# Patient Record
Sex: FEMALE | Race: WHITE | NOT HISPANIC OR LATINO | Employment: OTHER | URBAN - NONMETROPOLITAN AREA
[De-identification: names, ages, dates, MRNs, and addresses within clinical notes are randomized per-mention and may not be internally consistent; named-entity substitution may affect disease eponyms.]

---

## 2017-02-09 ENCOUNTER — OFFICE VISIT (OUTPATIENT)
Dept: FAMILY MEDICINE CLINIC | Facility: CLINIC | Age: 57
End: 2017-02-09

## 2017-02-09 VITALS
OXYGEN SATURATION: 98 % | SYSTOLIC BLOOD PRESSURE: 120 MMHG | WEIGHT: 211 LBS | TEMPERATURE: 99.1 F | DIASTOLIC BLOOD PRESSURE: 70 MMHG | HEIGHT: 66 IN | RESPIRATION RATE: 12 BRPM | BODY MASS INDEX: 33.91 KG/M2 | HEART RATE: 95 BPM

## 2017-02-09 DIAGNOSIS — G36.0 NEUROMYELITIS OPTICA (HCC): ICD-10-CM

## 2017-02-09 DIAGNOSIS — N31.9 NEUROGENIC DYSFUNCTION OF THE URINARY BLADDER: ICD-10-CM

## 2017-02-09 DIAGNOSIS — E55.9 VITAMIN D DEFICIENCY: ICD-10-CM

## 2017-02-09 DIAGNOSIS — Z00.00 HEALTHCARE MAINTENANCE: ICD-10-CM

## 2017-02-09 DIAGNOSIS — Z85.41 HISTORY OF CERVICAL CANCER: ICD-10-CM

## 2017-02-09 DIAGNOSIS — E78.2 MIXED HYPERLIPIDEMIA: Primary | ICD-10-CM

## 2017-02-09 DIAGNOSIS — J44.9 CHRONIC OBSTRUCTIVE PULMONARY DISEASE, UNSPECIFIED COPD TYPE (HCC): ICD-10-CM

## 2017-02-09 DIAGNOSIS — M79.2 NEUROPATHIC PAIN: ICD-10-CM

## 2017-02-09 DIAGNOSIS — F17.200 CURRENT SMOKER: ICD-10-CM

## 2017-02-09 PROCEDURE — 99214 OFFICE O/P EST MOD 30 MIN: CPT | Performed by: GENERAL PRACTICE

## 2017-02-09 NOTE — PROGRESS NOTES
Subjective   Mary Alvares is a 56 y.o. female.     History of Present Illness     Neuromyelitis Optica  She complains of a persistent visual impairment associated with weakness of her gait and allodynia from the waist down.  She has a history of bladder dysfunction with recurrent urinary tract infections.  She continues to be followed by neurology and while records are unavailable apparently underwent a recent reassessment.  No changes were made in her management and she will return again in July    COPD  The patient reports that her cough has been unchanged. She states that the symptoms occur at any time during the day or night. She reports that her symptoms become worse with activity and exposure to cold air. Her symptoms are reduced with rest. The patient states she also has no other symptoms. She is following the treatment plan, including medications as directed. She currently smokes approximately 1 packs per day.    Dyslipidemia  Compliance with treatment has been fair. The patient exercises occasionally. She is currently being prescribed the following medication for her dyslipidemia - lifestyle modifcation.  Patient has declined lipid-lowering therapy. Most recent lipids include  Lab Results   Component Value Date    TRIG 252 (H) 08/09/2016    TRIG 421 (H) 08/03/2015    TRIG 266 (H) 03/27/2014    HDL 42 (L) 08/09/2016    HDL 45 (L) 08/03/2015    HDL 48 (L) 03/27/2014    LDLCALC 241 (H) 08/09/2016    LDL No Calculation 08/03/2015     (H) 03/27/2014     The following portions of the patient's history were reviewed and updated as appropriate: allergies, current medications, past medical history, past social history and problem list.    Review of Systems   Constitutional: Positive for fatigue. Negative for appetite change, chills, fever and unexpected weight change.   HENT: Negative for congestion, ear pain, rhinorrhea, sneezing, sore throat and voice change.    Eyes: Positive for visual disturbance.    Respiratory: Positive for cough. Negative for shortness of breath and wheezing.    Cardiovascular: Negative for chest pain, palpitations and leg swelling.   Gastrointestinal: Negative for abdominal pain, blood in stool, constipation, diarrhea, nausea and vomiting.   Endocrine: Negative for polydipsia.   Genitourinary: Negative for difficulty urinating, dysuria, frequency, hematuria, menstrual problem, pelvic pain, urgency, vaginal bleeding and vaginal discharge.   Musculoskeletal: Negative for arthralgias, back pain, joint swelling, myalgias and neck pain.   Skin: Negative for color change.   Neurological: Positive for weakness (mild generalized with gait instability) and numbness. Negative for tremors and headaches.   Psychiatric/Behavioral: Negative for dysphoric mood, sleep disturbance and suicidal ideas. The patient is not nervous/anxious.      Objective   Physical Exam   Constitutional: She is oriented to person, place, and time. No distress.   Gait slow and cautious.  No apparent distress.  No pallor, cyanosis, diaphoresis, or jaundice.   HENT:   Head: Atraumatic.   Right Ear: Tympanic membrane, external ear and ear canal normal.   Left Ear: Tympanic membrane, external ear and ear canal normal.   Nose: Nose normal.   Mouth/Throat: Oropharynx is clear and moist. Mucous membranes are not pale and not cyanotic.   Eyes: EOM are normal. Pupils are equal, round, and reactive to light. No scleral icterus.   Neck: No JVD present. Carotid bruit is not present. No tracheal deviation present. No thyromegaly present.   Cardiovascular: Normal rate, regular rhythm, S1 normal, S2 normal and intact distal pulses.  Exam reveals no gallop, no S3 and no S4.    No murmur heard.  Pulmonary/Chest: No accessory muscle usage or stridor. No respiratory distress. She has decreased breath sounds in the right lower field and the left lower field. She has wheezes (mild diffuse).   Abdominal: Soft. Normal aorta and bowel sounds are  normal. She exhibits no distension, no abdominal bruit and no mass. There is no hepatosplenomegaly. There is no tenderness. No hernia.   Musculoskeletal: She exhibits no tenderness or deformity.       Vascular Status -  Her exam exhibits no right foot edema. Her exam exhibits left foot vasculature abnormal and no left foot edema.  Lymphadenopathy:        Head (right side): No submandibular adenopathy present.        Head (left side): No submandibular adenopathy present.     She has no cervical adenopathy.   Neurological: She is alert and oriented to person, place, and time. She displays normal reflexes. No cranial nerve deficit. She exhibits abnormal muscle tone (mild right-sided spasticity). Gait abnormal. Coordination normal.   Reflex Scores:       Tricep reflexes are 2+ on the right side and 2+ on the left side.       Bicep reflexes are 2+ on the right side and 2+ on the left side.       Brachioradialis reflexes are 2+ on the right side and 2+ on the left side.       Patellar reflexes are 3+ on the right side and 3+ on the left side.       Achilles reflexes are 3+ on the right side and 3+ on the left side.  Skin: Skin is warm and dry. No rash noted. She is not diaphoretic. No cyanosis. No pallor. Nails show no clubbing.   Psychiatric: She has a normal mood and affect.     Assessment/Plan   Problems Addressed this Visit        Cardiovascular and Mediastinum    Mixed hyperlipidemia - Primary  Encouraged to continue to work on her diet and what exercise she can tolerate        Respiratory    COPD (chronic obstructive pulmonary disease)  COPD is stable.  Reminded of the importance of smoking cessation  Encouraged to remain as active as symptoms allow for       Digestive    Vitamin D deficiency       Nervous and Auditory    Neuropathic pain    Neuromyelitis optica  Supportive therapy.    Reminded to follow-up with neurology        Genitourinary    Neurogenic dysfunction of the urinary bladder       Other    Current  smoker  Reminded of the options with respect to cessation     History of cervical cancer  Patient remains uninterested in a Pap smear     Healthcare maintenance  Patient states she underwent a DEXA scan and mammogram since last year.  We'll try to obtain records.  Reminded of the potential benefits of colon and lung cancer screening.  She remains uninterested at present

## 2017-05-30 DIAGNOSIS — G36.0 NEUROMYELITIS OPTICA (HCC): ICD-10-CM

## 2017-05-30 DIAGNOSIS — M79.2 NEUROPATHIC PAIN: ICD-10-CM

## 2017-05-30 RX ORDER — TRAMADOL HYDROCHLORIDE 50 MG/1
50 TABLET ORAL 4 TIMES DAILY
Qty: 120 TABLET | Refills: 2 | Status: SHIPPED | OUTPATIENT
Start: 2017-05-30 | End: 2018-01-16 | Stop reason: SDUPTHER

## 2017-05-30 RX ORDER — DIAZEPAM 5 MG/1
TABLET ORAL
Qty: 60 TABLET | Refills: 2 | Status: SHIPPED | OUTPATIENT
Start: 2017-05-30 | End: 2017-10-16 | Stop reason: SDUPTHER

## 2017-05-30 RX ORDER — BACLOFEN 20 MG/1
20 TABLET ORAL 3 TIMES DAILY
Qty: 90 TABLET | Refills: 3 | Status: SHIPPED | OUTPATIENT
Start: 2017-05-30 | End: 2017-10-16 | Stop reason: SDUPTHER

## 2017-05-31 RX ORDER — BACLOFEN 20 MG/1
TABLET ORAL
Qty: 270 TABLET | Refills: 3 | OUTPATIENT
Start: 2017-05-31

## 2017-07-05 RX ORDER — CIPROFLOXACIN 500 MG/1
500 TABLET, FILM COATED ORAL 2 TIMES DAILY
Qty: 20 TABLET | Refills: 0 | Status: SHIPPED | OUTPATIENT
Start: 2017-07-05 | End: 2017-07-15

## 2017-08-18 ENCOUNTER — OFFICE VISIT (OUTPATIENT)
Dept: FAMILY MEDICINE CLINIC | Facility: CLINIC | Age: 57
End: 2017-08-18

## 2017-08-18 VITALS
WEIGHT: 203 LBS | HEART RATE: 120 BPM | HEIGHT: 66 IN | TEMPERATURE: 98.6 F | BODY MASS INDEX: 32.62 KG/M2 | DIASTOLIC BLOOD PRESSURE: 68 MMHG | OXYGEN SATURATION: 91 % | SYSTOLIC BLOOD PRESSURE: 108 MMHG

## 2017-08-18 DIAGNOSIS — J01.01 ACUTE RECURRENT MAXILLARY SINUSITIS: Primary | ICD-10-CM

## 2017-08-18 PROCEDURE — 99213 OFFICE O/P EST LOW 20 MIN: CPT | Performed by: PHYSICIAN ASSISTANT

## 2017-08-18 RX ORDER — ASPIRIN 81 MG/1
81 TABLET, CHEWABLE ORAL DAILY
COMMUNITY
End: 2020-12-04

## 2017-08-18 RX ORDER — AZITHROMYCIN 250 MG/1
TABLET, FILM COATED ORAL
Qty: 6 TABLET | Refills: 0 | Status: SHIPPED | OUTPATIENT
Start: 2017-08-18 | End: 2017-09-08

## 2017-08-23 NOTE — PROGRESS NOTES
Subjective   Mary Alvares is a 57 y.o. female.     History of Present Illness     Acute illness-  She complains of runny nose, facial pressure, teeth hurt, and sore throat x 1 week.  She is on imuran and has immunocompetancy issue.      The following portions of the patient's history were reviewed and updated as appropriate: allergies, current medications, past family history, past medical history, past social history, past surgical history and problem list.    Review of Systems   Constitutional: Negative for activity change, appetite change and fever.   HENT: Negative for ear pain, sinus pressure and sore throat.    Eyes: Negative for pain and visual disturbance.   Respiratory: Negative for cough and chest tightness.    Cardiovascular: Negative for chest pain and palpitations.   Gastrointestinal: Negative for abdominal pain, constipation, diarrhea, nausea and vomiting.   Endocrine: Negative for polydipsia and polyuria.   Genitourinary: Negative for dysuria and frequency.   Musculoskeletal: Negative for back pain and myalgias.   Skin: Negative for color change and rash.   Allergic/Immunologic: Negative for food allergies and immunocompromised state.   Neurological: Negative for dizziness, syncope and headaches.   Hematological: Negative for adenopathy. Does not bruise/bleed easily.   Psychiatric/Behavioral: Negative for hallucinations and suicidal ideas. The patient is not nervous/anxious.        Objective   Physical Exam   Constitutional: She is oriented to person, place, and time. She appears well-developed and well-nourished. No distress.   HENT:   Head: Normocephalic and atraumatic.   Right Ear: Tympanic membrane, external ear and ear canal normal.   Left Ear: Tympanic membrane, external ear and ear canal normal.   Max sinus tenderness   Neck: Normal range of motion. Neck supple. No thyromegaly present.   Cardiovascular: Normal rate and regular rhythm.    No murmur heard.  Pulmonary/Chest: Effort normal and breath  sounds normal. She has no wheezes. She has no rales.   Neurological: She is alert and oriented to person, place, and time.   Skin: Skin is warm and dry.   Psychiatric: She has a normal mood and affect. Her behavior is normal.              Assessment/Plan   Diagnoses and all orders for this visit:    Acute recurrent maxillary sinusitis  -     azithromycin (ZITHROMAX) 250 MG tablet; Take 2 tablets the first day, then 1 tablet daily for 4 days.

## 2017-09-08 ENCOUNTER — OFFICE VISIT (OUTPATIENT)
Dept: FAMILY MEDICINE CLINIC | Facility: CLINIC | Age: 57
End: 2017-09-08

## 2017-09-08 DIAGNOSIS — M79.2 NEUROPATHIC PAIN: ICD-10-CM

## 2017-09-08 DIAGNOSIS — E78.2 MIXED HYPERLIPIDEMIA: Primary | ICD-10-CM

## 2017-09-08 DIAGNOSIS — G36.0 NEUROMYELITIS OPTICA (HCC): ICD-10-CM

## 2017-09-08 DIAGNOSIS — J44.9 CHRONIC OBSTRUCTIVE PULMONARY DISEASE, UNSPECIFIED COPD TYPE (HCC): ICD-10-CM

## 2017-09-08 DIAGNOSIS — E55.9 VITAMIN D DEFICIENCY: ICD-10-CM

## 2017-09-08 DIAGNOSIS — Z85.41 HISTORY OF CERVICAL CANCER: ICD-10-CM

## 2017-09-08 DIAGNOSIS — F17.200 CURRENT SMOKER: ICD-10-CM

## 2017-09-08 DIAGNOSIS — Z87.440 H/O RECURRENT URINARY TRACT INFECTION: ICD-10-CM

## 2017-09-08 DIAGNOSIS — N31.9 NEUROGENIC DYSFUNCTION OF THE URINARY BLADDER: ICD-10-CM

## 2017-09-08 DIAGNOSIS — Z00.00 HEALTHCARE MAINTENANCE: ICD-10-CM

## 2017-09-08 PROCEDURE — 99214 OFFICE O/P EST MOD 30 MIN: CPT | Performed by: GENERAL PRACTICE

## 2017-09-08 NOTE — PROGRESS NOTES
Subjective   Mary Alvares is a 57 y.o. female.     History of Present Illness     Neuromyelitis Optica  She complains of a persistent visual impairment associated with weakness of her gait and allodynia from the waist down.  She has a history of bladder dysfunction with recurrent urinary tract infections.  She continues to be followed by neurology and underwent a recent reassessment with no changes made in her management. She was advised to undergo an opthalmology assessment and was scheduled back in 6 months    COPD  The patient reports that her cough has been unchanged. She states that this symptom occurs at any time during the day or night. She reports that her symptom becomes worse with activity and exposure to cold air. Her symptom is reduced with rest. The patient states she has no other symptoms. She is following the treatment plan, including medications as directed. She currently smokes approximately 1 pack per day.    Dyslipidemia  Compliance with treatment has been fair. The patient exercises occasionally. She is currently being prescribed the following medication for her dyslipidemia - lifestyle modifcation.  Patient has declined lipid-lowering therapy. She has had no recent labs    The following portions of the patient's history were reviewed and updated as appropriate: allergies, current medications, past medical history, past social history and problem list.    Review of Systems   Constitutional: Positive for fatigue. Negative for appetite change, chills, fever and unexpected weight change.   HENT: Negative for congestion, ear pain, rhinorrhea, sneezing, sore throat and voice change.    Eyes: Positive for visual disturbance.   Respiratory: Positive for cough. Negative for shortness of breath and wheezing.    Cardiovascular: Negative for chest pain, palpitations and leg swelling.   Gastrointestinal: Negative for abdominal pain, blood in stool, constipation, diarrhea, nausea and vomiting.   Endocrine:  Negative for polydipsia.   Genitourinary: Negative for difficulty urinating, dysuria, frequency, hematuria, menstrual problem, pelvic pain, urgency, vaginal bleeding and vaginal discharge.   Musculoskeletal: Negative for arthralgias, back pain, joint swelling, myalgias and neck pain.   Skin: Negative for color change.   Neurological: Positive for weakness (mild generalized with gait instability) and numbness. Negative for tremors and headaches.   Psychiatric/Behavioral: Negative for dysphoric mood, sleep disturbance and suicidal ideas. The patient is not nervous/anxious.      Objective   Physical Exam   Constitutional: She is oriented to person, place, and time. No distress.   Gait slow and cautious.  No apparent distress.  No pallor, cyanosis, diaphoresis, or jaundice.   HENT:   Head: Atraumatic.   Right Ear: Tympanic membrane, external ear and ear canal normal.   Left Ear: Tympanic membrane, external ear and ear canal normal.   Nose: Nose normal.   Mouth/Throat: Oropharynx is clear and moist. Mucous membranes are not pale and not cyanotic.   Eyes: EOM are normal. Pupils are equal, round, and reactive to light. No scleral icterus.   Neck: No JVD present. Carotid bruit is not present. No tracheal deviation present. No thyromegaly present.   Cardiovascular: Normal rate, regular rhythm, S1 normal, S2 normal and intact distal pulses.  Exam reveals no gallop, no S3 and no S4.    No murmur heard.  Pulmonary/Chest: No accessory muscle usage or stridor. No respiratory distress. She has decreased breath sounds in the right lower field and the left lower field. She has wheezes (mild diffuse).   Abdominal: Soft. Normal aorta and bowel sounds are normal. She exhibits no distension, no abdominal bruit and no mass. There is no hepatosplenomegaly. There is no tenderness. No hernia.   Musculoskeletal: She exhibits no tenderness or deformity.       Vascular Status -  Her exam exhibits no right foot edema. Her exam exhibits left  foot vasculature abnormal and no left foot edema.  Lymphadenopathy:        Head (right side): No submandibular adenopathy present.        Head (left side): No submandibular adenopathy present.     She has no cervical adenopathy.   Neurological: She is alert and oriented to person, place, and time. She displays normal reflexes. No cranial nerve deficit. She exhibits abnormal muscle tone (mild right-sided spasticity). Gait abnormal. Coordination normal.   Reflex Scores:       Tricep reflexes are 2+ on the right side and 2+ on the left side.       Bicep reflexes are 2+ on the right side and 2+ on the left side.       Brachioradialis reflexes are 2+ on the right side and 2+ on the left side.       Patellar reflexes are 3+ on the right side and 3+ on the left side.       Achilles reflexes are 3+ on the right side and 3+ on the left side.  Skin: Skin is warm and dry. No rash noted. She is not diaphoretic. No cyanosis. No pallor. Nails show no clubbing.   Psychiatric: She has a normal mood and affect.     Assessment/Plan   Problems Addressed this Visit        Cardiovascular and Mediastinum    Mixed hyperlipidemia  Encouraged to continue to work on her diet and exercise plan.  Fasting labs scheduled    Relevant Orders    Comprehensive Metabolic Panel    Lipid Panel    TSH       Respiratory    COPD (chronic obstructive pulmonary disease)  COPD is stable.  Reminded of the importance of smoking cessation  Encouraged to remain as active as symptoms allow for    Relevant Orders    CBC & Differential       Digestive    Vitamin D deficiency  Will continue to monitor    Relevant Orders    Vitamin D 25 Hydroxy       Nervous and Auditory    Neuropathic pain    Neuromyelitis optica  Will arrange an opthalmology assessment  Follow up with neurology    Relevant Orders    Vitamin B12    Ambulatory Referral to Ophthalmology       Genitourinary    Neurogenic dysfunction of the urinary bladder       Other    Current smoker    History of  cervical cancer  Patient remains uninterested in a pap smear    H/O recurrent urinary tract infection    Healthcare maintenance  Recommended an updated mammogram  - patient will let us know when she wants this scheduled. She remains uninterested in colon or lung cancer screening or in any immunizations

## 2017-09-09 VITALS
TEMPERATURE: 98 F | BODY MASS INDEX: 32.62 KG/M2 | DIASTOLIC BLOOD PRESSURE: 70 MMHG | RESPIRATION RATE: 12 BRPM | WEIGHT: 203 LBS | OXYGEN SATURATION: 95 % | HEIGHT: 66 IN | HEART RATE: 96 BPM | SYSTOLIC BLOOD PRESSURE: 125 MMHG

## 2017-10-16 DIAGNOSIS — G36.0 NEUROMYELITIS OPTICA (HCC): ICD-10-CM

## 2017-10-16 DIAGNOSIS — M79.2 NEUROPATHIC PAIN: ICD-10-CM

## 2017-10-16 RX ORDER — DIAZEPAM 5 MG/1
TABLET ORAL
Qty: 60 TABLET | Refills: 0 | Status: SHIPPED | OUTPATIENT
Start: 2017-10-16 | End: 2018-01-16 | Stop reason: SDUPTHER

## 2017-10-16 RX ORDER — BACLOFEN 20 MG/1
TABLET ORAL
Qty: 120 TABLET | Refills: 5 | Status: SHIPPED | OUTPATIENT
Start: 2017-10-16 | End: 2018-11-14 | Stop reason: SDUPTHER

## 2018-01-16 ENCOUNTER — OFFICE VISIT (OUTPATIENT)
Dept: FAMILY MEDICINE CLINIC | Facility: CLINIC | Age: 58
End: 2018-01-16

## 2018-01-16 DIAGNOSIS — M79.2 NEUROPATHIC PAIN: ICD-10-CM

## 2018-01-16 DIAGNOSIS — E78.2 MIXED HYPERLIPIDEMIA: Primary | ICD-10-CM

## 2018-01-16 DIAGNOSIS — E55.9 VITAMIN D DEFICIENCY: ICD-10-CM

## 2018-01-16 DIAGNOSIS — J44.9 CHRONIC OBSTRUCTIVE PULMONARY DISEASE, UNSPECIFIED COPD TYPE (HCC): ICD-10-CM

## 2018-01-16 DIAGNOSIS — N31.9 NEUROGENIC DYSFUNCTION OF THE URINARY BLADDER: ICD-10-CM

## 2018-01-16 DIAGNOSIS — F17.200 CURRENT SMOKER: ICD-10-CM

## 2018-01-16 DIAGNOSIS — Z87.440 H/O RECURRENT URINARY TRACT INFECTION: ICD-10-CM

## 2018-01-16 DIAGNOSIS — Z00.00 HEALTHCARE MAINTENANCE: ICD-10-CM

## 2018-01-16 DIAGNOSIS — G36.0 NEUROMYELITIS OPTICA (HCC): ICD-10-CM

## 2018-01-16 PROCEDURE — 99214 OFFICE O/P EST MOD 30 MIN: CPT | Performed by: GENERAL PRACTICE

## 2018-01-16 RX ORDER — DIAZEPAM 5 MG/1
TABLET ORAL
Qty: 60 TABLET | Refills: 2 | Status: SHIPPED | OUTPATIENT
Start: 2018-01-16 | End: 2018-05-17 | Stop reason: SDUPTHER

## 2018-01-16 RX ORDER — TRAMADOL HYDROCHLORIDE 50 MG/1
50 TABLET ORAL 4 TIMES DAILY
Qty: 120 TABLET | Refills: 2 | Status: SHIPPED | OUTPATIENT
Start: 2018-01-16 | End: 2018-05-07 | Stop reason: SDUPTHER

## 2018-01-16 RX ORDER — GABAPENTIN 600 MG/1
600 TABLET ORAL 3 TIMES DAILY
Qty: 90 TABLET | Refills: 2 | Status: SHIPPED | OUTPATIENT
Start: 2018-01-16 | End: 2018-05-17 | Stop reason: SDUPTHER

## 2018-01-17 VITALS
OXYGEN SATURATION: 96 % | DIASTOLIC BLOOD PRESSURE: 70 MMHG | RESPIRATION RATE: 12 BRPM | WEIGHT: 211 LBS | TEMPERATURE: 98.2 F | SYSTOLIC BLOOD PRESSURE: 115 MMHG | HEIGHT: 66 IN | BODY MASS INDEX: 33.91 KG/M2 | HEART RATE: 100 BPM

## 2018-01-17 NOTE — PROGRESS NOTES
Subjective   Mary Alvares is a 57 y.o. female.     History of Present Illness     Neuromyelitis Optica  She complains of a persistent visual impairment associated with weakness of her gait and allodynia from the waist down.  She has a history of bladder dysfunction with recurrent urinary tract infections.  She continues to be followed by neurology and underwent an assessment since last here with no apparent changes made in her management. She also underwent an opthalmology assessment since last here. She will continue to be followed by each every 6 months    COPD  The patient reports that her cough has been unchanged. She states that this symptom occurs at any time during the day or night. She reports that her symptom becomes worse with activity and exposure to cold air. Her symptom is reduced with rest. The patient states she has no other symptoms. She is following the treatment plan, including medications as directed. She continues to smoke approximately 1 pack per day.    Dyslipidemia  Compliance with treatment has been fair. The patient exercises occasionally. She is currently being prescribed the following medication for her dyslipidemia - lifestyle modifcation.  Patient has declined lipid-lowering therapy. She has had no recent labs    The following portions of the patient's history were reviewed and updated as appropriate: allergies, current medications, past medical history, past social history and problem list.    Review of Systems   Constitutional: Positive for fatigue. Negative for appetite change, chills, fever and unexpected weight change.   HENT: Negative for congestion, ear pain, rhinorrhea, sneezing, sore throat and voice change.    Eyes: Positive for visual disturbance.   Respiratory: Positive for cough. Negative for shortness of breath and wheezing.    Cardiovascular: Negative for chest pain, palpitations and leg swelling.   Gastrointestinal: Negative for abdominal pain, blood in stool, constipation,  diarrhea, nausea and vomiting.   Endocrine: Negative for polydipsia.   Genitourinary: Negative for difficulty urinating, dysuria, frequency, hematuria, menstrual problem, pelvic pain, urgency, vaginal bleeding and vaginal discharge.   Musculoskeletal: Negative for arthralgias, back pain, joint swelling, myalgias and neck pain.   Skin: Negative for color change.   Neurological: Positive for weakness (mild generalized with gait instability) and numbness. Negative for tremors and headaches.   Psychiatric/Behavioral: Negative for dysphoric mood, sleep disturbance and suicidal ideas. The patient is not nervous/anxious.      Objective   Physical Exam   Constitutional: She is oriented to person, place, and time. No distress.   Gait slow and cautious.  No apparent distress.  No pallor, cyanosis, diaphoresis, or jaundice.   HENT:   Head: Atraumatic.   Right Ear: Tympanic membrane, external ear and ear canal normal.   Left Ear: Tympanic membrane, external ear and ear canal normal.   Nose: Nose normal.   Mouth/Throat: Oropharynx is clear and moist. Mucous membranes are not pale and not cyanotic.   Eyes: EOM are normal. Pupils are equal, round, and reactive to light. No scleral icterus.   Neck: No JVD present. Carotid bruit is not present. No tracheal deviation present. No thyromegaly present.   Cardiovascular: Normal rate, regular rhythm, S1 normal, S2 normal and intact distal pulses.  Exam reveals no gallop, no S3 and no S4.    No murmur heard.  Pulmonary/Chest: No accessory muscle usage or stridor. No respiratory distress. She has decreased breath sounds in the right lower field and the left lower field. She has wheezes (mild diffuse).   Abdominal: Soft. Normal aorta and bowel sounds are normal. She exhibits no distension, no abdominal bruit and no mass. There is no hepatosplenomegaly. There is no tenderness. No hernia.   Musculoskeletal: She exhibits no tenderness or deformity.       Vascular Status -  Her exam exhibits no  right foot edema. Her exam exhibits left foot vasculature abnormal and no left foot edema.  Lymphadenopathy:        Head (right side): No submandibular adenopathy present.        Head (left side): No submandibular adenopathy present.     She has no cervical adenopathy.   Neurological: She is alert and oriented to person, place, and time. She displays normal reflexes. No cranial nerve deficit. She exhibits abnormal muscle tone (mild right-sided spasticity). Gait abnormal. Coordination normal.   Reflex Scores:       Tricep reflexes are 2+ on the right side and 2+ on the left side.       Bicep reflexes are 2+ on the right side and 2+ on the left side.       Brachioradialis reflexes are 2+ on the right side and 2+ on the left side.       Patellar reflexes are 3+ on the right side and 3+ on the left side.       Achilles reflexes are 3+ on the right side and 3+ on the left side.  Skin: Skin is warm and dry. No rash noted. She is not diaphoretic. No cyanosis. No pallor. Nails show no clubbing.   Psychiatric: She has a normal mood and affect.     Assessment/Plan   Problems Addressed this Visit        Cardiovascular and Mediastinum    Mixed hyperlipidemia   Encouraged to continue to work on her diet and exercise plan.  Updated labs arranged    Relevant Orders    Lipid Panel    TSH       Respiratory    COPD (chronic obstructive pulmonary disease)  COPD is stable.  Reminded of the importance of smoking cessation  Encouraged to remain as active as symptoms allow for       Digestive    Vitamin D deficiency  Continue maintenance supplementation with monitoring.    Relevant Orders    Vitamin D 25 Hydroxy       Nervous and Auditory    Neuropathic pain  Reminded regarding symptomatic treatment.   Continue current medication    Relevant Medications    traMADol (ULTRAM) 50 MG tablet    diazePAM (VALIUM) 5 MG tablet    gabapentin (NEURONTIN) 600 MG tablet    Neuromyelitis optica  Follow up with neurology    Relevant Medications     traMADol (ULTRAM) 50 MG tablet    diazePAM (VALIUM) 5 MG tablet    gabapentin (NEURONTIN) 600 MG tablet    Other Relevant Orders    CBC & Differential    Comprehensive Metabolic Panel       Genitourinary    Neurogenic dysfunction of the urinary bladder       Other    Current smoker    H/O recurrent urinary tract infection    Healthcare maintenance  Hepatitis C screen will be performed with her next labs  Patient remains uninterested in any immunizations nor in breast, cervical, colon, or lung cancer screening    Relevant Orders    Hepatitis C Antibody

## 2018-01-29 RX ORDER — AZATHIOPRINE 50 MG/1
50 TABLET ORAL DAILY
Qty: 30 TABLET | Refills: 2 | Status: SHIPPED | OUTPATIENT
Start: 2018-01-29 | End: 2020-03-06 | Stop reason: SDUPTHER

## 2018-04-26 ENCOUNTER — TELEPHONE (OUTPATIENT)
Dept: FAMILY MEDICINE CLINIC | Facility: CLINIC | Age: 58
End: 2018-04-26

## 2018-04-26 RX ORDER — CIPROFLOXACIN 500 MG/1
500 TABLET, FILM COATED ORAL 2 TIMES DAILY
Qty: 20 TABLET | Refills: 0 | Status: SHIPPED | OUTPATIENT
Start: 2018-04-26 | End: 2018-05-06

## 2018-04-26 NOTE — TELEPHONE ENCOUNTER
Pt is aware.   ----- Message from Jacky Bob MD sent at 4/26/2018  3:07 PM EDT -----  Ida sent a script but ideally she should drop off a urine sample for C+S as it may be a bacteria resistant to ciprofloxacin and once she starts on it any further cultures will be useless    ----- Message -----  From: Luli Domínguez MA  Sent: 4/26/2018   1:08 PM  To: Jacky Bob MD    Patient wants to know if you will send her in some Cipro 500 for a uti infection.

## 2018-05-07 DIAGNOSIS — G36.0 NEUROMYELITIS OPTICA (HCC): ICD-10-CM

## 2018-05-07 DIAGNOSIS — M79.2 NEUROPATHIC PAIN: ICD-10-CM

## 2018-05-07 RX ORDER — TRAMADOL HYDROCHLORIDE 50 MG/1
TABLET ORAL
Qty: 120 TABLET | Refills: 0 | Status: SHIPPED | OUTPATIENT
Start: 2018-05-07 | End: 2018-05-17 | Stop reason: SDUPTHER

## 2018-05-17 ENCOUNTER — OFFICE VISIT (OUTPATIENT)
Dept: FAMILY MEDICINE CLINIC | Facility: CLINIC | Age: 58
End: 2018-05-17

## 2018-05-17 VITALS
OXYGEN SATURATION: 95 % | SYSTOLIC BLOOD PRESSURE: 120 MMHG | DIASTOLIC BLOOD PRESSURE: 70 MMHG | BODY MASS INDEX: 33.91 KG/M2 | WEIGHT: 211 LBS | HEIGHT: 66 IN | RESPIRATION RATE: 12 BRPM | TEMPERATURE: 98.2 F | HEART RATE: 95 BPM

## 2018-05-17 DIAGNOSIS — J44.9 COPD MIXED TYPE (HCC): ICD-10-CM

## 2018-05-17 DIAGNOSIS — J30.2 CHRONIC SEASONAL ALLERGIC RHINITIS, UNSPECIFIED TRIGGER: ICD-10-CM

## 2018-05-17 DIAGNOSIS — M79.2 NEUROPATHIC PAIN: ICD-10-CM

## 2018-05-17 DIAGNOSIS — Z87.440 H/O RECURRENT URINARY TRACT INFECTION: ICD-10-CM

## 2018-05-17 DIAGNOSIS — G36.0 NEUROMYELITIS OPTICA (HCC): ICD-10-CM

## 2018-05-17 DIAGNOSIS — E55.9 VITAMIN D DEFICIENCY: ICD-10-CM

## 2018-05-17 DIAGNOSIS — E78.2 MIXED HYPERLIPIDEMIA: Primary | ICD-10-CM

## 2018-05-17 DIAGNOSIS — N31.9 NEUROGENIC DYSFUNCTION OF THE URINARY BLADDER: ICD-10-CM

## 2018-05-17 DIAGNOSIS — Z00.00 HEALTHCARE MAINTENANCE: ICD-10-CM

## 2018-05-17 DIAGNOSIS — F17.200 CURRENT SMOKER: ICD-10-CM

## 2018-05-17 LAB
25(OH)D3 SERPL-MCNC: 23 NG/ML
ALBUMIN SERPL-MCNC: 4.5 G/DL (ref 3.5–5)
ALBUMIN/GLOB SERPL: 1.4 G/DL (ref 1.5–2.5)
ALP SERPL-CCNC: 89 U/L (ref 35–104)
ALT SERPL W P-5'-P-CCNC: 24 U/L (ref 10–36)
ANION GAP SERPL CALCULATED.3IONS-SCNC: 4.3 MMOL/L (ref 3.6–11.2)
AST SERPL-CCNC: 31 U/L (ref 10–30)
BASOPHILS # BLD AUTO: 0.03 10*3/MM3 (ref 0–0.3)
BASOPHILS NFR BLD AUTO: 0.4 % (ref 0–2)
BILIRUB SERPL-MCNC: 0.3 MG/DL (ref 0.2–1.8)
BUN BLD-MCNC: 11 MG/DL (ref 7–21)
BUN/CREAT SERPL: 16.2 (ref 7–25)
CALCIUM SPEC-SCNC: 9.6 MG/DL (ref 7.7–10)
CHLORIDE SERPL-SCNC: 105 MMOL/L (ref 99–112)
CHOLEST SERPL-MCNC: 299 MG/DL (ref 0–200)
CO2 SERPL-SCNC: 26.7 MMOL/L (ref 24.3–31.9)
CREAT BLD-MCNC: 0.68 MG/DL (ref 0.43–1.29)
DEPRECATED RDW RBC AUTO: 49.4 FL (ref 37–54)
EOSINOPHIL # BLD AUTO: 0.16 10*3/MM3 (ref 0–0.7)
EOSINOPHIL NFR BLD AUTO: 2.3 % (ref 0–5)
ERYTHROCYTE [DISTWIDTH] IN BLOOD BY AUTOMATED COUNT: 15.1 % (ref 11.5–14.5)
GFR SERPL CREATININE-BSD FRML MDRD: 89 ML/MIN/1.73
GLOBULIN UR ELPH-MCNC: 3.2 GM/DL
GLUCOSE BLD-MCNC: 84 MG/DL (ref 70–110)
HCT VFR BLD AUTO: 47.9 % (ref 37–47)
HCV AB SER DONR QL: NORMAL
HDLC SERPL-MCNC: 40 MG/DL (ref 60–100)
HGB BLD-MCNC: 15.3 G/DL (ref 12–16)
IMM GRANULOCYTES # BLD: 0.02 10*3/MM3 (ref 0–0.03)
IMM GRANULOCYTES NFR BLD: 0.3 % (ref 0–0.5)
LDLC SERPL CALC-MCNC: 211 MG/DL (ref 0–100)
LDLC/HDLC SERPL: 5.28 {RATIO}
LYMPHOCYTES # BLD AUTO: 2.86 10*3/MM3 (ref 1–3)
LYMPHOCYTES NFR BLD AUTO: 41.8 % (ref 21–51)
MCH RBC QN AUTO: 29.1 PG (ref 27–33)
MCHC RBC AUTO-ENTMCNC: 31.9 G/DL (ref 33–37)
MCV RBC AUTO: 91.2 FL (ref 80–94)
MONOCYTES # BLD AUTO: 0.68 10*3/MM3 (ref 0.1–0.9)
MONOCYTES NFR BLD AUTO: 9.9 % (ref 0–10)
NEUTROPHILS # BLD AUTO: 3.09 10*3/MM3 (ref 1.4–6.5)
NEUTROPHILS NFR BLD AUTO: 45.3 % (ref 30–70)
OSMOLALITY SERPL CALC.SUM OF ELEC: 270.6 MOSM/KG (ref 273–305)
PLATELET # BLD AUTO: 249 10*3/MM3 (ref 130–400)
PMV BLD AUTO: 12.1 FL (ref 6–10)
POTASSIUM BLD-SCNC: 4.7 MMOL/L (ref 3.5–5.3)
PROT SERPL-MCNC: 7.7 G/DL (ref 6–8)
RBC # BLD AUTO: 5.25 10*6/MM3 (ref 4.2–5.4)
SODIUM BLD-SCNC: 136 MMOL/L (ref 135–153)
TRIGL SERPL-MCNC: 239 MG/DL (ref 0–150)
TSH SERPL DL<=0.05 MIU/L-ACNC: 1 MIU/ML (ref 0.55–4.78)
VLDLC SERPL-MCNC: 47.8 MG/DL
WBC NRBC COR # BLD: 6.84 10*3/MM3 (ref 4.5–12.5)

## 2018-05-17 PROCEDURE — 82306 VITAMIN D 25 HYDROXY: CPT | Performed by: GENERAL PRACTICE

## 2018-05-17 PROCEDURE — 99214 OFFICE O/P EST MOD 30 MIN: CPT | Performed by: GENERAL PRACTICE

## 2018-05-17 PROCEDURE — 84443 ASSAY THYROID STIM HORMONE: CPT | Performed by: GENERAL PRACTICE

## 2018-05-17 PROCEDURE — 36415 COLL VENOUS BLD VENIPUNCTURE: CPT | Performed by: GENERAL PRACTICE

## 2018-05-17 PROCEDURE — 85025 COMPLETE CBC W/AUTO DIFF WBC: CPT | Performed by: GENERAL PRACTICE

## 2018-05-17 PROCEDURE — 80053 COMPREHEN METABOLIC PANEL: CPT | Performed by: GENERAL PRACTICE

## 2018-05-17 PROCEDURE — 80061 LIPID PANEL: CPT | Performed by: GENERAL PRACTICE

## 2018-05-17 PROCEDURE — 86803 HEPATITIS C AB TEST: CPT | Performed by: GENERAL PRACTICE

## 2018-05-17 RX ORDER — DIAZEPAM 5 MG/1
TABLET ORAL
Qty: 60 TABLET | Refills: 2 | Status: SHIPPED | OUTPATIENT
Start: 2018-05-17 | End: 2019-05-24 | Stop reason: SDUPTHER

## 2018-05-17 RX ORDER — GABAPENTIN 600 MG/1
1200 TABLET ORAL 3 TIMES DAILY
Qty: 180 TABLET | Refills: 2 | Status: SHIPPED | OUTPATIENT
Start: 2018-05-17 | End: 2019-05-24 | Stop reason: SDUPTHER

## 2018-05-17 RX ORDER — TRAMADOL HYDROCHLORIDE 50 MG/1
50 TABLET ORAL 4 TIMES DAILY
Qty: 120 TABLET | Refills: 2 | Status: SHIPPED | OUTPATIENT
Start: 2018-05-17 | End: 2018-09-13 | Stop reason: SDUPTHER

## 2018-05-17 RX ORDER — LORATADINE 10 MG/1
10 TABLET ORAL DAILY PRN
Qty: 30 TABLET | Refills: 5 | Status: SHIPPED | OUTPATIENT
Start: 2018-05-17 | End: 2020-09-16

## 2018-05-17 NOTE — PROGRESS NOTES
Subjective   Mary Alvares is a 58 y.o. female.     History of Present Illness     Neuromyelitis Optica  She complains of a persistent visual impairment associated with weakness of her gait and allodynia from the waist down.  She has a history of bladder dysfunction with recurrent urinary tract infections.  She denies any change in her symptoms.  She continues to be followed by neurology and underwent an assessment since last here with no apparent changes made in her management.  She will be undergoing an updated MRI in 6 months with follow-up again afterward    Allergic Rhinitis  Patient has a history of allergic rhinitis. Patient's symptoms include sneezing, clear rhinorrhea, nasal congestion and postnasal drip. These symptoms are perennial with seasonal exacerbation. The patient has been suffering from these symptoms for a number of years . The patient has tried loratadine with good relief of symptoms.      COPD  The patient reports that her cough has been unchanged. She states that this symptom occurs at any time during the day or night. She reports that her symptom becomes worse with activity and exposure to cold air. Her symptom is reduced with rest. The patient denies any chest pain, shortness of breath, hemoptysis, night sweats, or weight loss. She is following the treatment plan, including medications as directed. She continues to smoke approximately 1 pack per day.    Dyslipidemia  Compliance with treatment has been fair. The patient exercises occasionally. She is currently being prescribed the following medication for her dyslipidemia - lifestyle modifcation.  Patient has declined lipid-lowering therapy.  Fasting labs were drawn this morning    The following portions of the patient's history were reviewed and updated as appropriate: allergies, current medications, past medical history, past social history and problem list.    Review of Systems   Constitutional: Positive for fatigue. Negative for appetite  change, chills, fever and unexpected weight change.   HENT: Positive for congestion, postnasal drip, rhinorrhea and sneezing. Negative for ear pain, sore throat and voice change.    Eyes: Positive for visual disturbance.   Respiratory: Positive for cough. Negative for shortness of breath and wheezing.    Cardiovascular: Negative for chest pain, palpitations and leg swelling.   Gastrointestinal: Negative for abdominal pain, blood in stool, constipation, diarrhea, nausea and vomiting.   Endocrine: Negative for polydipsia.   Genitourinary: Negative for difficulty urinating, dysuria, frequency, hematuria, menstrual problem, pelvic pain, urgency, vaginal bleeding and vaginal discharge.   Musculoskeletal: Negative for arthralgias, back pain, joint swelling, myalgias and neck pain.   Skin: Negative for color change.   Neurological: Positive for weakness (mild generalized with gait instability) and numbness. Negative for tremors and headaches.   Psychiatric/Behavioral: Negative for dysphoric mood, sleep disturbance and suicidal ideas. The patient is not nervous/anxious.      Objective   Physical Exam   Constitutional: She is oriented to person, place, and time. No distress.   Gait slow and cautious.  No apparent distress.  No pallor, cyanosis, diaphoresis, or jaundice.   HENT:   Head: Atraumatic.   Right Ear: Tympanic membrane, external ear and ear canal normal.   Left Ear: Tympanic membrane, external ear and ear canal normal.   Nose: Nose normal.   Mouth/Throat: Oropharynx is clear and moist. Mucous membranes are not pale and not cyanotic.   Eyes: EOM are normal. Pupils are equal, round, and reactive to light. No scleral icterus.   Neck: No JVD present. Carotid bruit is not present. No tracheal deviation present. No thyromegaly present.   Cardiovascular: Normal rate, regular rhythm, S1 normal, S2 normal and intact distal pulses.  Exam reveals no gallop, no S3 and no S4.    No murmur heard.  Pulmonary/Chest: No accessory  muscle usage or stridor. No respiratory distress. She has decreased breath sounds in the right lower field and the left lower field. She has wheezes (mild diffuse).   Abdominal: Soft. Normal aorta and bowel sounds are normal. She exhibits no distension, no abdominal bruit and no mass. There is no hepatosplenomegaly. There is no tenderness. No hernia.   Musculoskeletal: She exhibits no tenderness or deformity.     Vascular Status -  Her right foot exhibits no edema. Her left foot exhibits normal foot vasculature  and no edema.  Lymphadenopathy:        Head (right side): No submandibular adenopathy present.        Head (left side): No submandibular adenopathy present.     She has no cervical adenopathy.   Neurological: She is alert and oriented to person, place, and time. She displays normal reflexes. No cranial nerve deficit. She exhibits abnormal muscle tone (mild right-sided spasticity). Gait abnormal. Coordination normal.   Reflex Scores:       Tricep reflexes are 2+ on the right side and 2+ on the left side.       Bicep reflexes are 2+ on the right side and 2+ on the left side.       Brachioradialis reflexes are 2+ on the right side and 2+ on the left side.       Patellar reflexes are 3+ on the right side and 3+ on the left side.       Achilles reflexes are 3+ on the right side and 3+ on the left side.  Skin: Skin is warm and dry. No rash noted. She is not diaphoretic. No cyanosis. No pallor. Nails show no clubbing.   Psychiatric: She has a normal mood and affect.     Assessment/Plan   Problems Addressed this Visit        Cardiovascular and Mediastinum    Mixed hyperlipidemia   Encouraged to continue to work on her diet and exercise plan.       Respiratory    COPD mixed type    COPD is stable.  Reminded of the importance of smoking cessation  Encouraged to remain as active as symptoms allow for        Chronic seasonal allergic rhinitis  Reminded regarding allergen avoidance.  We will continue loratadine as needed      Relevant Medications    loratadine (CLARITIN) 10 MG tablet       Digestive    Vitamin D deficiency  Continue supplementation with monitoring.       Nervous and Auditory    Neuropathic pain  Reminded regarding symptomatic treatment.   Continue current medication    Relevant Medications    diazePAM (VALIUM) 5 MG tablet    gabapentin (NEURONTIN) 600 MG tablet    traMADol (ULTRAM) 50 MG tablet    Neuromyelitis optica  Supportive therapy  Follow up with neurology     Relevant Medications    diazePAM (VALIUM) 5 MG tablet    gabapentin (NEURONTIN) 600 MG tablet    traMADol (ULTRAM) 50 MG tablet       Genitourinary    Neurogenic dysfunction of the urinary bladder       Other    Current smoker    H/O recurrent urinary tract infection    Healthcare maintenance  Patient remains uninterested in any immunizations or any cancer screening modalities

## 2018-08-31 ENCOUNTER — OFFICE VISIT (OUTPATIENT)
Dept: FAMILY MEDICINE CLINIC | Facility: CLINIC | Age: 58
End: 2018-08-31

## 2018-08-31 VITALS
BODY MASS INDEX: 33.59 KG/M2 | DIASTOLIC BLOOD PRESSURE: 68 MMHG | OXYGEN SATURATION: 98 % | TEMPERATURE: 98 F | HEIGHT: 66 IN | WEIGHT: 209 LBS | HEART RATE: 98 BPM | SYSTOLIC BLOOD PRESSURE: 114 MMHG

## 2018-08-31 DIAGNOSIS — R30.0 DYSURIA: ICD-10-CM

## 2018-08-31 DIAGNOSIS — N31.9 NEUROGENIC DYSFUNCTION OF THE URINARY BLADDER: ICD-10-CM

## 2018-08-31 DIAGNOSIS — N30.90 CYSTITIS: Primary | ICD-10-CM

## 2018-08-31 DIAGNOSIS — E78.2 MIXED HYPERLIPIDEMIA: ICD-10-CM

## 2018-08-31 DIAGNOSIS — J44.9 COPD MIXED TYPE (HCC): ICD-10-CM

## 2018-08-31 PROCEDURE — 99214 OFFICE O/P EST MOD 30 MIN: CPT | Performed by: PHYSICIAN ASSISTANT

## 2018-08-31 PROCEDURE — 81003 URINALYSIS AUTO W/O SCOPE: CPT | Performed by: PHYSICIAN ASSISTANT

## 2018-08-31 RX ORDER — CIPROFLOXACIN 500 MG/1
500 TABLET, FILM COATED ORAL 2 TIMES DAILY
Qty: 20 TABLET | Refills: 0 | Status: SHIPPED | OUTPATIENT
Start: 2018-08-31 | End: 2018-09-10

## 2018-09-13 DIAGNOSIS — G36.0 NEUROMYELITIS OPTICA (HCC): ICD-10-CM

## 2018-09-13 DIAGNOSIS — M79.2 NEUROPATHIC PAIN: ICD-10-CM

## 2018-09-13 RX ORDER — TRAMADOL HYDROCHLORIDE 50 MG/1
TABLET ORAL
Qty: 120 TABLET | Refills: 0 | Status: SHIPPED | OUTPATIENT
Start: 2018-09-13 | End: 2019-01-23 | Stop reason: SDUPTHER

## 2018-11-14 RX ORDER — BACLOFEN 20 MG/1
TABLET ORAL
Qty: 120 TABLET | Refills: 5 | Status: SHIPPED | OUTPATIENT
Start: 2018-11-14 | End: 2021-01-01 | Stop reason: SDUPTHER

## 2018-11-27 ENCOUNTER — OFFICE VISIT (OUTPATIENT)
Dept: FAMILY MEDICINE CLINIC | Facility: CLINIC | Age: 58
End: 2018-11-27

## 2018-11-27 DIAGNOSIS — N31.9 NEUROGENIC DYSFUNCTION OF THE URINARY BLADDER: ICD-10-CM

## 2018-11-27 DIAGNOSIS — E78.2 MIXED HYPERLIPIDEMIA: Primary | ICD-10-CM

## 2018-11-27 DIAGNOSIS — E55.9 VITAMIN D DEFICIENCY: ICD-10-CM

## 2018-11-27 DIAGNOSIS — F17.200 CURRENT SMOKER: ICD-10-CM

## 2018-11-27 DIAGNOSIS — M79.2 NEUROPATHIC PAIN: ICD-10-CM

## 2018-11-27 DIAGNOSIS — G36.0 NEUROMYELITIS OPTICA (HCC): ICD-10-CM

## 2018-11-27 DIAGNOSIS — J44.9 COPD MIXED TYPE (HCC): ICD-10-CM

## 2018-11-27 DIAGNOSIS — J30.2 CHRONIC SEASONAL ALLERGIC RHINITIS: ICD-10-CM

## 2018-11-27 DIAGNOSIS — Z00.00 HEALTHCARE MAINTENANCE: ICD-10-CM

## 2018-11-27 PROCEDURE — 99214 OFFICE O/P EST MOD 30 MIN: CPT | Performed by: GENERAL PRACTICE

## 2018-11-27 NOTE — PROGRESS NOTES
Subjective   Mary Alvares is a 58 y.o. female.     History of Present Illness     Neuromyelitis Optica  She complains of a persistent visual impairment associated with weakness of her gait and allodynia from the waist down.  She has a history of bladder dysfunction with urinary retention, recurrent urinary tract infections, and self catheterizations.  She denies any change in her symptoms.  She continues to be followed by neurology and underwent an assessment since last here with no apparent changes made in her management. An updated MRI of the brain was performed on 8/27/18 and reported as showing chronic sinusitis, degenerative changes of the C spine, as well as an abnormal flow void in the left distal ICA consistent with simply slow flow or a stenosis. CTA was recommended. She denies any changes in her vision, strength or sensation and has had no problem talking or understanding what is said to her. She is LHD.     Allergic Rhinitis  Patient has a history of allergic rhinitis. Patient's symptoms include sneezing, clear rhinorrhea, nasal congestion and postnasal drip. These symptoms are perennial with seasonal exacerbation. The patient has been suffering from these symptoms for a number of years . The patient has tried loratadine with good relief of symptoms.      COPD  The patient reports that her cough has been unchanged. She states that this symptom occurs at any time during the day or night. She reports that her symptom becomes worse with activity and exposure to cold air. Her symptom is reduced with rest. The patient denies any chest pain, shortness of breath, hemoptysis, night sweats, or weight loss. She is following the treatment plan, including medications as directed. She continues to smoke approximately 1 pack per day.    Dyslipidemia  Compliance with treatment has been fair. The patient exercises occasionally. She is currently being prescribed the following medication for her dyslipidemia - lifestyle  modifcation.  Patient has repeatedly declined lipid-lowering therapy  Lab Results   Component Value Date    CHOL 299 (H) 05/17/2018    CHLPL 350 (H) 08/03/2015    TRIG 239 (H) 05/17/2018    HDL 40 (L) 05/17/2018     (H) 05/17/2018     Labs  Most recent vitamin D 23    The following portions of the patient's history were reviewed and updated as appropriate: allergies, current medications, past medical history, past social history and problem list.    Review of Systems   Constitutional: Positive for fatigue. Negative for appetite change, chills, fever and unexpected weight change.   HENT: Positive for congestion, postnasal drip, rhinorrhea and sneezing. Negative for ear pain, sore throat and voice change.    Eyes: Positive for visual disturbance.   Respiratory: Positive for cough. Negative for shortness of breath and wheezing.    Cardiovascular: Negative for chest pain, palpitations and leg swelling.   Gastrointestinal: Negative for abdominal pain, blood in stool, constipation, diarrhea, nausea and vomiting.   Endocrine: Negative for polydipsia.   Genitourinary: Negative for difficulty urinating, dysuria, frequency, hematuria, menstrual problem, pelvic pain, urgency, vaginal bleeding and vaginal discharge.   Musculoskeletal: Negative for arthralgias, back pain, joint swelling, myalgias and neck pain.   Skin: Negative for color change.   Neurological: Positive for weakness (mild generalized with gait instability) and numbness. Negative for tremors and headaches.   Psychiatric/Behavioral: Negative for dysphoric mood, sleep disturbance and suicidal ideas. The patient is not nervous/anxious.      Objective   Physical Exam   Constitutional: She is oriented to person, place, and time. No distress.   Gait slow and cautious.  No apparent distress.  No pallor, cyanosis, diaphoresis, or jaundice.   HENT:   Head: Atraumatic.   Right Ear: Tympanic membrane, external ear and ear canal normal.   Left Ear: Tympanic membrane,  external ear and ear canal normal.   Nose: Nose normal.   Mouth/Throat: Oropharynx is clear and moist. Mucous membranes are not pale and not cyanotic.   Eyes: EOM are normal. Pupils are equal, round, and reactive to light. No scleral icterus.   Neck: No JVD present. Carotid bruit is not present. No tracheal deviation present. No thyromegaly present.   Cardiovascular: Normal rate, regular rhythm, S1 normal, S2 normal and intact distal pulses. Exam reveals no gallop, no S3 and no S4.   No murmur heard.  Pulmonary/Chest: No accessory muscle usage or stridor. No respiratory distress. She has decreased breath sounds in the right lower field and the left lower field. She has wheezes (mild diffuse).   Abdominal: Soft. Normal aorta and bowel sounds are normal. She exhibits no distension, no abdominal bruit and no mass. There is no hepatosplenomegaly. There is no tenderness. No hernia.   Musculoskeletal: She exhibits no tenderness or deformity.     Vascular Status -  Her right foot exhibits no edema. Her left foot exhibits normal foot vasculature  and no edema.  Lymphadenopathy:        Head (right side): No submandibular adenopathy present.        Head (left side): No submandibular adenopathy present.     She has no cervical adenopathy.   Neurological: She is alert and oriented to person, place, and time. She displays normal reflexes. No cranial nerve deficit. She exhibits abnormal muscle tone (mild right-sided spasticity). Gait abnormal. Coordination normal.   Reflex Scores:       Tricep reflexes are 2+ on the right side and 2+ on the left side.       Bicep reflexes are 2+ on the right side and 2+ on the left side.       Brachioradialis reflexes are 2+ on the right side and 2+ on the left side.       Patellar reflexes are 3+ on the right side and 3+ on the left side.       Achilles reflexes are 3+ on the right side and 3+ on the left side.  Skin: Skin is warm and dry. No rash noted. She is not diaphoretic. No cyanosis. No  pallor. Nails show no clubbing.   Psychiatric: She has a normal mood and affect.     Assessment/Plan   Problems Addressed this Visit        Cardiovascular and Mediastinum    Mixed hyperlipidemia  Encouraged to continue to work on her diet and exercise plan.  Reminded of the potential benefits of statin therapy particularly in the context of possible carotid artery stenosis. Patient will consider       Respiratory    COPD mixed type (CMS/HCC)   COPD is stable.  Encouraged to remain as active as symptoms allow for    Chronic seasonal allergic rhinitis  Reminded regarding allergen avoidance.  Continue current medication       Digestive    Vitamin D deficiency       Nervous and Auditory    Neuropathic pain  Reminded regarding symptomatic treatment.   Continue current medication    Neuromyelitis optica (CMS/HCC)  With slow flow or stenosis left distal ICA on most recent MRI brain  Patient uninterested in pursuing a CTA at present but will discuss with her neurologist at her next appointment  Reviewed the potential symptoms of stroke and the appropriate action to take (911)       Genitourinary    Neurogenic dysfunction of the urinary bladder  With urinary retention and recurrent urinary tract infections  Patient self catheterizes       Other    Current smoker  Reminded of the importance of cessation particularly given the above    Healthcare maintenance  Patient remains uninterested in any immunizations or cancer screening

## 2018-11-28 VITALS
DIASTOLIC BLOOD PRESSURE: 65 MMHG | RESPIRATION RATE: 12 BRPM | WEIGHT: 213 LBS | OXYGEN SATURATION: 96 % | SYSTOLIC BLOOD PRESSURE: 110 MMHG | TEMPERATURE: 98.1 F | HEART RATE: 104 BPM | BODY MASS INDEX: 34.23 KG/M2 | HEIGHT: 66 IN

## 2019-01-04 ENCOUNTER — TELEPHONE (OUTPATIENT)
Dept: FAMILY MEDICINE CLINIC | Facility: CLINIC | Age: 59
End: 2019-01-04

## 2019-01-04 RX ORDER — CIPROFLOXACIN 500 MG/1
500 TABLET, FILM COATED ORAL 2 TIMES DAILY
Qty: 20 TABLET | Refills: 0 | Status: SHIPPED | OUTPATIENT
Start: 2019-01-04 | End: 2019-01-14

## 2019-01-04 NOTE — TELEPHONE ENCOUNTER
Left message for patient with information.   ----- Message from Jacky Bob MD sent at 1/4/2019 11:36 AM EST -----  Emailed  However if she is able to drop off a urine here or at the hospital for culture it would be a good thing    ----- Message -----  From: Luli Domínguez MA  Sent: 1/4/2019   9:53 AM  To: Jacky Bob MD    Patient called in and requesting if you could send her in a prescription for cipro 500mg 2 times daily. She said she has a UTI infection.

## 2019-01-23 DIAGNOSIS — G36.0 NEUROMYELITIS OPTICA (HCC): ICD-10-CM

## 2019-01-23 DIAGNOSIS — M79.2 NEUROPATHIC PAIN: ICD-10-CM

## 2019-01-23 RX ORDER — TRAMADOL HYDROCHLORIDE 50 MG/1
50 TABLET ORAL 4 TIMES DAILY
Qty: 120 TABLET | Refills: 0 | Status: SHIPPED | OUTPATIENT
Start: 2019-01-23 | End: 2019-05-24 | Stop reason: SDUPTHER

## 2019-05-24 ENCOUNTER — OFFICE VISIT (OUTPATIENT)
Dept: FAMILY MEDICINE CLINIC | Facility: CLINIC | Age: 59
End: 2019-05-24

## 2019-05-24 DIAGNOSIS — I65.23 BILATERAL CAROTID ARTERY STENOSIS: ICD-10-CM

## 2019-05-24 DIAGNOSIS — M79.2 NEUROPATHIC PAIN: ICD-10-CM

## 2019-05-24 DIAGNOSIS — Z00.00 HEALTHCARE MAINTENANCE: ICD-10-CM

## 2019-05-24 DIAGNOSIS — N31.9 NEUROGENIC DYSFUNCTION OF THE URINARY BLADDER: ICD-10-CM

## 2019-05-24 DIAGNOSIS — J44.9 COPD MIXED TYPE (HCC): ICD-10-CM

## 2019-05-24 DIAGNOSIS — E55.9 VITAMIN D DEFICIENCY: ICD-10-CM

## 2019-05-24 DIAGNOSIS — F17.200 CURRENT SMOKER: ICD-10-CM

## 2019-05-24 DIAGNOSIS — J30.2 CHRONIC SEASONAL ALLERGIC RHINITIS: ICD-10-CM

## 2019-05-24 DIAGNOSIS — Z85.41 HISTORY OF CERVICAL CANCER: ICD-10-CM

## 2019-05-24 DIAGNOSIS — E78.2 MIXED HYPERLIPIDEMIA: Primary | ICD-10-CM

## 2019-05-24 DIAGNOSIS — G36.0 NEUROMYELITIS OPTICA (HCC): ICD-10-CM

## 2019-05-24 PROCEDURE — 99214 OFFICE O/P EST MOD 30 MIN: CPT | Performed by: GENERAL PRACTICE

## 2019-05-24 RX ORDER — GABAPENTIN 600 MG/1
1200 TABLET ORAL 3 TIMES DAILY
Qty: 180 TABLET | Refills: 2 | Status: SHIPPED | OUTPATIENT
Start: 2019-05-24 | End: 2020-05-20 | Stop reason: SDUPTHER

## 2019-05-24 RX ORDER — DIAZEPAM 5 MG/1
TABLET ORAL
Qty: 60 TABLET | Refills: 2 | Status: SHIPPED | OUTPATIENT
Start: 2019-05-24 | End: 2019-09-12 | Stop reason: SDUPTHER

## 2019-05-24 RX ORDER — ERGOCALCIFEROL 1.25 MG/1
50000 CAPSULE ORAL WEEKLY
Refills: 11 | COMMUNITY
Start: 2019-04-16

## 2019-05-24 RX ORDER — TRAMADOL HYDROCHLORIDE 50 MG/1
50 TABLET ORAL 4 TIMES DAILY
Qty: 120 TABLET | Refills: 2 | Status: SHIPPED | OUTPATIENT
Start: 2019-05-24 | End: 2019-09-12 | Stop reason: SDUPTHER

## 2019-05-24 RX ORDER — ATORVASTATIN CALCIUM 40 MG/1
40 TABLET, FILM COATED ORAL NIGHTLY
Qty: 30 TABLET | Refills: 5 | Status: SHIPPED | OUTPATIENT
Start: 2019-05-24 | End: 2019-09-12

## 2019-05-24 NOTE — PROGRESS NOTES
Subjective   Mary Alvares is a 59 y.o. female.     History of Present Illness     Neuromyelitis Optica  She complains of a persistent visual impairment associated with weakness of her gait and allodynia from the waist down.  She has a history of bladder dysfunction with urinary retention, recurrent urinary tract infections, and self catheterizations.  She denies any change in her symptoms.  She continues to be followed by neurology and underwent an assessment since last here with no apparent changes made in her management. An updated MRI of the brain was performed on 8/27/18 and reported as showing chronic sinusitis, degenerative changes of the C spine, as well as an abnormal flow void in the left distal ICA consistent with simply slow flow or a stenosis. CTA was recommended and this was performed on 3/27/2019.  The study was reported as showing mild stenosis at the origin of the left common carotid artery and occlusion at the origin of the internal carotid artery.  Calcification with a focal 60% stenosis at the origin of the right internal carotid artery was also noted.. She denies any changes in her vision, strength or sensation and has had no problem talking or understanding what is said to her. She is LHD.  She remains on ASA 81 daily    Allergic Rhinitis  Patient has a history of allergic rhinitis. Patient's symptoms include sneezing, clear rhinorrhea, nasal congestion and postnasal drip. These symptoms are perennial with seasonal exacerbation. The patient has been suffering from these symptoms for a number of years . The patient has tried loratadine with good relief of symptoms.      COPD  The patient reports that her cough has been unchanged. She states that this symptom occurs at any time during the day or night. She reports that her symptom becomes worse with activity and exposure to cold air. Her symptom is reduced with rest. The patient denies any chest pain, shortness of breath, hemoptysis, night sweats,  or weight loss. She is following the treatment plan, including medications as directed. She continues to smoke approximately 1 pack per day.    Dyslipidemia  Compliance with treatment has been fair. The patient exercises occasionally. She is currently being prescribed the following medication for her dyslipidemia - lifestyle modifcation.  Patient has repeatedly declined lipid-lowering therapy    The following portions of the patient's history were reviewed and updated as appropriate: allergies, current medications, past medical history, past social history and problem list.    Review of Systems   Constitutional: Positive for fatigue. Negative for appetite change, chills, fever and unexpected weight change.   HENT: Positive for congestion, postnasal drip, rhinorrhea and sneezing. Negative for ear pain, sore throat and voice change.    Eyes: Positive for visual disturbance.   Respiratory: Positive for cough. Negative for shortness of breath and wheezing.    Cardiovascular: Negative for chest pain, palpitations and leg swelling.   Gastrointestinal: Negative for abdominal pain, blood in stool, constipation, diarrhea, nausea and vomiting.   Endocrine: Negative for polydipsia.   Genitourinary: Negative for difficulty urinating, dysuria, frequency, hematuria, menstrual problem, pelvic pain, urgency, vaginal bleeding and vaginal discharge.   Musculoskeletal: Negative for arthralgias, back pain, joint swelling, myalgias and neck pain.   Skin: Negative for color change.   Neurological: Positive for weakness (mild generalized with gait instability) and numbness. Negative for tremors and headaches.   Psychiatric/Behavioral: Negative for dysphoric mood, sleep disturbance and suicidal ideas. The patient is not nervous/anxious.      Objective   Physical Exam   Constitutional: She is oriented to person, place, and time. No distress.   Gait slow and cautious.  No apparent distress.  No pallor, cyanosis, diaphoresis, or jaundice.    HENT:   Head: Atraumatic.   Right Ear: Tympanic membrane, external ear and ear canal normal.   Left Ear: Tympanic membrane, external ear and ear canal normal.   Nose: Nose normal.   Mouth/Throat: Oropharynx is clear and moist. Mucous membranes are not pale and not cyanotic.   Eyes: EOM are normal. Pupils are equal, round, and reactive to light. No scleral icterus.   Neck: No JVD present. Carotid bruit is not present. No tracheal deviation present. No thyromegaly present.   Cardiovascular: Normal rate, regular rhythm, S1 normal, S2 normal and intact distal pulses. Exam reveals no gallop, no S3 and no S4.   No murmur heard.  Pulmonary/Chest: No accessory muscle usage or stridor. No respiratory distress. She has decreased breath sounds in the right lower field and the left lower field. She has wheezes (mild diffuse).   Abdominal: Soft. Normal aorta and bowel sounds are normal. She exhibits no distension, no abdominal bruit and no mass. There is no hepatosplenomegaly. There is no tenderness. No hernia.   Musculoskeletal: She exhibits no tenderness or deformity.     Vascular Status -  Her right foot exhibits no edema. Her left foot exhibits normal foot vasculature  and no edema.  Lymphadenopathy:        Head (right side): No submandibular adenopathy present.        Head (left side): No submandibular adenopathy present.     She has no cervical adenopathy.   Neurological: She is alert and oriented to person, place, and time. She displays normal reflexes. No cranial nerve deficit. She exhibits abnormal muscle tone (mild right-sided spasticity). Gait abnormal. Coordination normal.   Reflex Scores:       Tricep reflexes are 2+ on the right side and 2+ on the left side.       Bicep reflexes are 2+ on the right side and 2+ on the left side.       Brachioradialis reflexes are 2+ on the right side and 2+ on the left side.       Patellar reflexes are 3+ on the right side and 3+ on the left side.       Achilles reflexes are 3+ on  the right side and 3+ on the left side.  Skin: Skin is warm and dry. No rash noted. She is not diaphoretic. No cyanosis. No pallor. Nails show no clubbing.   Psychiatric: She has a normal mood and affect.     Assessment/Plan   Problems Addressed this Visit        Cardiovascular and Mediastinum    Mixed hyperlipidemia   Encouraged to continue to work on her diet and exercise plan.  Reminded of the potential benefits as well as risks of statin therapy particularly in light of the results of her carotid imaging  Agreed on a trial of atorvastatin  Fasting labs will be updated in several months    Relevant Medications    atorvastatin (LIPITOR) 40 MG tablet    Other Relevant Orders    Comprehensive Metabolic Panel    Lipid Panel    TSH    Bilateral carotid artery stenosis  Lengthy discussion regarding the potential implications.   Reminded regarding risk factor modification with an emphasis on tobacco cessation.  As above.  Continue ASA 81 daily  Patient un but will consider interested in undergoing a vascular surgery assessment at present  Will find out if her neurologist wants to see her back sooner       Respiratory    COPD mixed type (CMS/HCC)   COPD is stable.  Reminded of the importance of smoking cessation  Encouraged to remain as active as symptoms allow for    Relevant Orders    CBC & Differential    Chronic seasonal allergic rhinitis       Digestive    Vitamin D deficiency  Continue supplementation with monitoring.    Relevant Orders    Vitamin D 25 Hydroxy       Nervous and Auditory    Neuropathic pain  Reminded regarding symptomatic treatment.   Continue current medication    Relevant Medications    diazePAM (VALIUM) 5 MG tablet    gabapentin (NEURONTIN) 600 MG tablet    traMADol (ULTRAM) 50 MG tablet    Neuromyelitis optica (CMS/HCC)  Supportive therapy  Continue current medication    Relevant Medications    diazePAM (VALIUM) 5 MG tablet    gabapentin (NEURONTIN) 600 MG tablet    traMADol (ULTRAM) 50 MG  tablet       Genitourinary    Neurogenic dysfunction of the urinary bladder       Other    Current smoker    History of cervical cancer    Healthcare maintenance  Patient remains uninterested in any immunizations

## 2019-05-26 VITALS
HEART RATE: 94 BPM | TEMPERATURE: 98.1 F | OXYGEN SATURATION: 97 % | DIASTOLIC BLOOD PRESSURE: 70 MMHG | SYSTOLIC BLOOD PRESSURE: 120 MMHG | HEIGHT: 66 IN | WEIGHT: 207.4 LBS | BODY MASS INDEX: 33.33 KG/M2 | RESPIRATION RATE: 12 BRPM

## 2019-05-29 ENCOUNTER — TELEPHONE (OUTPATIENT)
Dept: FAMILY MEDICINE CLINIC | Facility: CLINIC | Age: 59
End: 2019-05-29

## 2019-05-29 NOTE — TELEPHONE ENCOUNTER
Spoke to Adamaris from Dr. Mauricio's office... She will discuss report with Dr. Mauricio.       ----- Message from Jacky Bob MD sent at 5/26/2019  1:31 PM EDT -----  Please find out if her neurologist -Dr.Nicole Mauricio -has received the CTA of her carotids performed in March and whether she wants to see her back sooner given the results

## 2019-09-12 ENCOUNTER — OFFICE VISIT (OUTPATIENT)
Dept: FAMILY MEDICINE CLINIC | Facility: CLINIC | Age: 59
End: 2019-09-12

## 2019-09-12 VITALS
DIASTOLIC BLOOD PRESSURE: 65 MMHG | HEART RATE: 102 BPM | SYSTOLIC BLOOD PRESSURE: 115 MMHG | WEIGHT: 205 LBS | RESPIRATION RATE: 12 BRPM | OXYGEN SATURATION: 96 % | HEIGHT: 66 IN | BODY MASS INDEX: 32.95 KG/M2 | TEMPERATURE: 98.6 F

## 2019-09-12 DIAGNOSIS — M79.2 NEUROPATHIC PAIN: ICD-10-CM

## 2019-09-12 DIAGNOSIS — I65.23 BILATERAL CAROTID ARTERY STENOSIS: ICD-10-CM

## 2019-09-12 DIAGNOSIS — F17.200 CURRENT SMOKER: ICD-10-CM

## 2019-09-12 DIAGNOSIS — E78.2 MIXED HYPERLIPIDEMIA: Primary | ICD-10-CM

## 2019-09-12 DIAGNOSIS — J44.9 COPD MIXED TYPE (HCC): ICD-10-CM

## 2019-09-12 DIAGNOSIS — E55.9 VITAMIN D DEFICIENCY: ICD-10-CM

## 2019-09-12 DIAGNOSIS — N31.9 NEUROGENIC DYSFUNCTION OF THE URINARY BLADDER: ICD-10-CM

## 2019-09-12 DIAGNOSIS — Z87.440 H/O RECURRENT URINARY TRACT INFECTION: ICD-10-CM

## 2019-09-12 DIAGNOSIS — J30.2 CHRONIC SEASONAL ALLERGIC RHINITIS: ICD-10-CM

## 2019-09-12 DIAGNOSIS — R82.90 ABNORMAL FINDING IN URINE: ICD-10-CM

## 2019-09-12 DIAGNOSIS — G36.0 NEUROMYELITIS OPTICA (HCC): ICD-10-CM

## 2019-09-12 DIAGNOSIS — Z00.00 HEALTHCARE MAINTENANCE: ICD-10-CM

## 2019-09-12 LAB
BILIRUB BLD-MCNC: NEGATIVE MG/DL
CLARITY, POC: ABNORMAL
COLOR UR: YELLOW
GLUCOSE UR STRIP-MCNC: NEGATIVE MG/DL
KETONES UR QL: NEGATIVE
LEUKOCYTE EST, POC: ABNORMAL
NITRITE UR-MCNC: NEGATIVE MG/ML
PH UR: 6 [PH] (ref 5–8)
PROT UR STRIP-MCNC: NEGATIVE MG/DL
RBC # UR STRIP: NEGATIVE /UL
SP GR UR: 1.01 (ref 1–1.03)
UROBILINOGEN UR QL: NORMAL

## 2019-09-12 PROCEDURE — 81003 URINALYSIS AUTO W/O SCOPE: CPT | Performed by: GENERAL PRACTICE

## 2019-09-12 PROCEDURE — G0439 PPPS, SUBSEQ VISIT: HCPCS | Performed by: GENERAL PRACTICE

## 2019-09-12 PROCEDURE — 87086 URINE CULTURE/COLONY COUNT: CPT | Performed by: GENERAL PRACTICE

## 2019-09-12 PROCEDURE — 81015 MICROSCOPIC EXAM OF URINE: CPT | Performed by: GENERAL PRACTICE

## 2019-09-12 RX ORDER — ROSUVASTATIN CALCIUM 20 MG/1
20 TABLET, COATED ORAL NIGHTLY
Qty: 30 TABLET | Refills: 5 | Status: SHIPPED | OUTPATIENT
Start: 2019-09-12 | End: 2020-12-04

## 2019-09-12 RX ORDER — DIAZEPAM 5 MG/1
TABLET ORAL
Qty: 60 TABLET | Refills: 2 | Status: SHIPPED | OUTPATIENT
Start: 2019-09-12 | End: 2020-08-03

## 2019-09-12 RX ORDER — CLOPIDOGREL BISULFATE 75 MG/1
TABLET ORAL DAILY
Refills: 11 | COMMUNITY
Start: 2019-08-26 | End: 2020-05-20 | Stop reason: SDUPTHER

## 2019-09-12 RX ORDER — TRAMADOL HYDROCHLORIDE 50 MG/1
50 TABLET ORAL 4 TIMES DAILY
Qty: 120 TABLET | Refills: 2 | Status: SHIPPED | OUTPATIENT
Start: 2019-09-12 | End: 2020-05-20 | Stop reason: SDUPTHER

## 2019-09-12 NOTE — PATIENT INSTRUCTIONS
Fall Prevention in the Home, Adult  Falls can cause injuries. They can happen to people of all ages. There are many things you can do to make your home safe and to help prevent falls. Ask for help when making these changes, if needed.  What actions can I take to prevent falls?  General Instructions  · Use good lighting in all rooms. Replace any light bulbs that burn out.  · Turn on the lights when you go into a dark area. Use night-lights.  · Keep items that you use often in easy-to-reach places. Lower the shelves around your home if necessary.  · Set up your furniture so you have a clear path. Avoid moving your furniture around.  · Do not have throw rugs and other things on the floor that can make you trip.  · Avoid walking on wet floors.  · If any of your floors are uneven, fix them.  · Add color or contrast paint or tape to clearly samira and help you see:  ? Any grab bars or handrails.  ? First and last steps of stairways.  ? Where the edge of each step is.  · If you use a stepladder:  ? Make sure that it is fully opened. Do not climb a closed stepladder.  ? Make sure that both sides of the stepladder are locked into place.  ? Ask someone to hold the stepladder for you while you use it.  · If there are any pets around you, be aware of where they are.  What can I do in the bathroom?    · Keep the floor dry. Clean up any water that spills onto the floor as soon as it happens.  · Remove soap buildup in the tub or shower regularly.  · Use non-skid mats or decals on the floor of the tub or shower.  · Attach bath mats securely with double-sided, non-slip rug tape.  · If you need to sit down in the shower, use a plastic, non-slip stool.  · Install grab bars by the toilet and in the tub and shower. Do not use towel bars as grab bars.  What can I do in the bedroom?  · Make sure that you have a light by your bed that is easy to reach.  · Do not use any sheets or blankets that are too big for your bed. They should not hang  down onto the floor.  · Have a firm chair that has side arms. You can use this for support while you get dressed.  What can I do in the kitchen?  · Clean up any spills right away.  · If you need to reach something above you, use a strong step stool that has a grab bar.  · Keep electrical cords out of the way.  · Do not use floor polish or wax that makes floors slippery. If you must use wax, use non-skid floor wax.  What can I do with my stairs?  · Do not leave any items on the stairs.  · Make sure that you have a light switch at the top of the stairs and the bottom of the stairs. If you do not have them, ask someone to add them for you.  · Make sure that there are handrails on both sides of the stairs, and use them. Fix handrails that are broken or loose. Make sure that handrails are as long as the stairways.  · Install non-slip stair treads on all stairs in your home.  · Avoid having throw rugs at the top or bottom of the stairs. If you do have throw rugs, attach them to the floor with carpet tape.  · Choose a carpet that does not hide the edge of the steps on the stairway.  · Check any carpeting to make sure that it is firmly attached to the stairs. Fix any carpet that is loose or worn.  What can I do on the outside of my home?  · Use bright outdoor lighting.  · Regularly fix the edges of walkways and driveways and fix any cracks.  · Remove anything that might make you trip as you walk through a door, such as a raised step or threshold.  · Trim any bushes or trees on the path to your home.  · Regularly check to see if handrails are loose or broken. Make sure that both sides of any steps have handrails.  · Install guardrails along the edges of any raised decks and porches.  · Clear walking paths of anything that might make someone trip, such as tools or rocks.  · Have any leaves, snow, or ice cleared regularly.  · Use sand or salt on walking paths during winter.  · Clean up any spills in your garage right away.  This includes grease or oil spills.  What other actions can I take?  · Wear shoes that:  ? Have a low heel. Do not wear high heels.  ? Have rubber bottoms.  ? Are comfortable and fit you well.  ? Are closed at the toe. Do not wear open-toe sandals.  · Use tools that help you move around (mobility aids) if they are needed. These include:  ? Canes.  ? Walkers.  ? Scooters.  ? Crutches.  · Review your medicines with your doctor. Some medicines can make you feel dizzy. This can increase your chance of falling.  Ask your doctor what other things you can do to help prevent falls.  Where to find more information  · Centers for Disease Control and Prevention, [place]: https://cdc.gov  · National [place] [employer] A[place]: https://yh8vywy.manuel.nih.gov  Contact a doctor if:  · You are afraid of falling at home.  · You feel weak, drowsy, or dizzy at home.  · You fall at home.  Summary  · There are many simple things that you can do to make your home safe and to help prevent falls.  · Ways to make your home safe include removing tripping hazards and installing grab bars in the bathroom.  · Ask for help when making these changes in your home.  This information is not intended to replace advice given to you by your health care provider. Make sure you discuss any questions you have with your health care provider.  Document Released: 10/14/2010 Document Revised: 08/02/2018 Document Reviewed: 08/02/2018  [employer] Interactive Patient Education © 2019 [employer]      Advance Directive    Advance directives are legal documents that let you make choices ahead of time about your health care and medical treatment in case you become unable to communicate for yourself. Advance directives are a way for you to communicate your wishes to family, friends, and health care providers. This can help convey your decisions about end-of-life care if you become unable to communicate.  Discussing and writing advance directives should happen over time rather  than all at once. Advance directives can be changed depending on your situation and what you want, even after you have signed the advance directives.  If you do not have an advance directive, some states assign family decision makers to act on your behalf based on how closely you are related to them. Each state has its own laws regarding advance directives. You may want to check with your health care provider, , or state representative about the laws in your state. There are different types of advance directives, such as:  · Medical power of .  · Living will.  · Do not resuscitate (DNR) or do not attempt resuscitation (DNAR) order.  Health care proxy and medical power of   A health care proxy, also called a health care agent, is a person who is appointed to make medical decisions for you in cases in which you are unable to make the decisions yourself. Generally, people choose someone they know well and trust to represent their preferences. Make sure to ask this person for an agreement to act as your proxy. A proxy may have to exercise judgment in the event of a medical decision for which your wishes are not known.  A medical power of  is a legal document that names your health care proxy. Depending on the laws in your state, after the document is written, it may also need to be:  · Signed.  · Notarized.  · Dated.  · Copied.  · Witnessed.  · Incorporated into your medical record.  You may also want to appoint someone to manage your financial affairs in a situation in which you are unable to do so. This is called a durable power of  for finances. It is a separate legal document from the durable power of  for health care. You may choose the same person or someone different from your health care proxy to act as your agent in financial matters.  If you do not appoint a proxy, or if there is a concern that the proxy is not acting in your best interests, a court-appointed  guardian may be designated to act on your behalf.  Living will  A living will is a set of instructions documenting your wishes about medical care when you cannot express them yourself. Health care providers should keep a copy of your living will in your medical record. You may want to give a copy to family members or friends. To alert caregivers in case of an emergency, you can place a card in your wallet to let them know that you have a living will and where they can find it. A living will is used if you become:  · Terminally ill.  · Incapacitated.  · Unable to communicate or make decisions.  Items to consider in your living will include:  · The use or non-use of life-sustaining equipment, such as dialysis machines and breathing machines (ventilators).  · A DNR or DNAR order, which is the instruction not to use cardiopulmonary resuscitation (CPR) if breathing or heartbeat stops.  · The use or non-use of tube feeding.  · Withholding of food and fluids.  · Comfort (palliative) care when the goal becomes comfort rather than a cure.  · Organ and tissue donation.  A living will does not give instructions for distributing your money and property if you should pass away. It is recommended that you seek the advice of a  when writing a will. Decisions about taxes, beneficiaries, and asset distribution will be legally binding. This process can relieve your family and friends of any concerns surrounding disputes or questions that may come up about the distribution of your assets.  DNR or DNAR  A DNR or DNAR order is a request not to have CPR in the event that your heart stops beating or you stop breathing. If a DNR or DNAR order has not been made and shared, a health care provider will try to help any patient whose heart has stopped or who has stopped breathing. If you plan to have surgery, talk with your health care provider about how your DNR or DNAR order will be followed if problems occur.  Summary  · Advance  directives are the legal documents that allow you to make choices ahead of time about your health care and medical treatment in case you become unable to communicate for yourself.  · The process of discussing and writing advance directives should happen over time. You can change the advance directives, even after you have signed them.  · Advance directives include DNR or DNAR orders, living mendez, and designating an agent as your medical power of .  This information is not intended to replace advice given to you by your health care provider. Make sure you discuss any questions you have with your health care provider.  Document Released: 03/26/2009 Document Revised: 11/06/2017 Document Reviewed: 11/06/2017  [employer] Interactive Patient Education © 2019 [employer]      Heart Disease Prevention  Heart disease is the leading cause of death in the world. Coronary artery disease is the most common cause of heart disease. This condition results when cholesterol and other substances (plaque) build up inside the walls of the blood vessels that supply your heart muscle (arteries). This buildup in arteries is called atherosclerosis. You can take actions to lower your risk of heart disease.  How can heart disease affect me?  Heart disease can cause many unpleasant symptoms and complications, such as:  · Chest pain (angina).  · Reduced or blocked blood flow to your heart. This can cause:  ? Irregular heartbeats (arrhythmias).  ? Heart attack.  ? Heart failure.  What can increase my risk?  The following factors may make you more likely to develop this condition:  · High blood pressure (hypertension).  · High cholesterol.  · Smoking.  · A diet high in saturated fats or trans fats.  · Lack of physical activity.  · Obesity.  · Drinking too much alcohol.  · Diabetes.  · Having a family history of heart disease.  What actions can I take to prevent heart disease?  Nutrition    · Eat a heart-healthy eating plan as told by  your health care provider. Examples include the DASH (Dietary Approaches to Stop Hypertension) eating plan or the Mediterranean diet.  · Generally, it is recommended that you:  ? Eat less salt (sodium). Ask your health care provider how much sodium is safe for you. Most people should have less than 2,300 mg each day.  ? Limit unhealthy fats, such as saturated and trans fats, in your diet. You can do this by eating low-fat dairy products, eating less red meat, and avoiding processed foods.  ? Eat healthy fats (omega-3 fatty acids). These are found in fish, such as mackerel or salmon.  ? Eat more fruits and vegetables. You should try to fill one-half of your plate with fruits and vegetables at each meal.  ? Eat more whole grains.  ? Avoid foods and drinks that have added sugars.  Lifestyle    · Get regular exercise. This is one of the most important things you can do for your health. Generally, it is recommended that you:  ? Exercise for at least 30 minutes on most days of the week (150 minutes each week). The exercise should increase your heart rate and make you sweat (aerobic exercise).  ? Add strength exercises on at least 2 days each week.  · Do not use any products that contain nicotine or tobacco, such as cigarettes and e-cigarettes. These can damage your heart and blood vessels. If you need help quitting, ask your health care provider.  Alcohol use  · Do not drink alcohol if:  ? Your health care provider tells you not to drink.  ? You are pregnant, may be pregnant, or are planning to become pregnant.  · If you drink alcohol, limit how much you have:  ? 0-1 drink a day for women.  ? 0-2 drinks a day for men.  · Be aware of how much alcohol is in your drink. In the [place] one drink equals one typical bottle of beer (12 oz), one-half glass of wine (5 oz), or one shot of hard liquor (1½ oz).  Medicines  · Take over-the-counter and prescription medicines only as told by your health care provider.  · Ask your health  care provider whether you should take an aspirin every day. Taking aspirin may help reduce your risk of heart disease and stroke.  · Depending on your risk factors, your health care provider may prescribe medicines to lower your risk of heart disease or to control related conditions. You may take medicine to:  ? Lower cholesterol.  ? Control blood pressure.  ? Control diabetes.  General information  · Keep your blood pressure under control, as recommended by your health care provider. For most healthy people, the upper number of your blood pressure (systolic) should be no higher than 120, and the lower number (diastolic) no higher than 80. Treatment may be needed if your blood pressure is higher than 130/80.  · Have your blood pressure checked at least every two years. Your health care provider may check your blood pressure more often if you have high blood pressure.  · After age 20, have your cholesterol checked every 4-6 years. If you have risk factors for heart disease, you may need to have it checked more frequently. Treatment may be needed if your cholesterol is high.  · Have your body mass index (BMI) checked every year. Your health care provider can calculate your BMI from your height and weight.  · Work with your health care provider to lose weight, if needed, or to maintain a healthy weight.  Where to find more information:  · Centers for Disease Control and Prevention: www.cdc.gov/heartdisease  · American Heart Association: www.heart.org  ? Take a free online heart disease risk quiz to better understand your personal risk factors.  Summary  · Heart disease is the leading cause of death in the world.  · Heart disease can cause chest pain, abnormal heart rhythms, heart attack, and heart failure.  · High blood pressure, high cholesterol, and smoking are the main risk factors for heart disease, although other factors also contribute.  · You can take actions to lower your chances of developing heart disease.  Work with your health care provider to reduce your risk by following a heart-healthy diet, being physically active, and controlling your weight, blood pressure, and cholesterol level.  This information is not intended to replace advice given to you by your health care provider. Make sure you discuss any questions you have with your health care provider.  Document Released: 08/01/2005 Document Revised: 01/02/2019 Document Reviewed: 01/02/2019  [employer] Interactive Patient Education © 2019 [employer]      Mammogram  A mammogram is an X-ray of the breasts that is done to check for abnormal changes. This procedure can screen for and detect any changes that may suggest breast cancer. A mammogram can also identify other changes and variations in the breast, such as:  · Inflammation of the breast tissue (mastitis).  · An infected area that contains a collection of pus (abscess).  · A fluid-filled sac (cyst).  · Fibrocystic changes. This is when breast tissue becomes denser, which can make the tissue feel rope-like or uneven under the skin.  · Tumors that are not cancerous (benign).  Tell a health care provider about:  · Any allergies you have.  · If you have breast implants.  · If you have had previous breast disease, biopsy, or surgery.  · If you are breastfeeding.  · Any possibility that you could be pregnant, if this applies.  · If you are younger than age 25.  · If you have a family history of breast cancer.  What are the risks?  Generally, this is a safe procedure. However, problems may occur, including:  · Exposure to radiation. Radiation levels are very low with this test.  · The results being misinterpreted.  · The need for further tests.  · The inability of the mammogram to detect certain cancers.  What happens before the procedure?  · Schedule your test about 1-2 weeks after your menstrual period. This is usually when your breasts are the least tender.  · If you have had a mammogram done at a different  facility in the past, get the mammogram X-rays or have them sent to your current exam facility in order to compare them.  · Wash your breasts and under your arms the day of the test.  · Do not wear deodorants, perfumes, lotions, or powders anywhere on your body on the day of the test.  · Remove any jewelry from your neck.  · Wear clothes that you can change into and out of easily.  What happens during the procedure?  · You will undress from the waist up and put on a gown.  · You will  front of the X-ray machine.  · Each breast will be placed between two plastic or glass plates. The plates will compress your breast for a few seconds. Try to stay as relaxed as possible during the procedure. This does not cause any harm to your breasts and any discomfort you feel will be very brief.  · X-rays will be taken from different angles of each breast.  The procedure may vary among health care providers and hospitals.  What happens after the procedure?  · The mammogram will be examined by a specialist (radiologist).  · You may need to repeat certain parts of the test, depending on the quality of the images. This is commonly done if the radiologist needs a better view of the breast tissue.  · Ask when your test results will be ready. Make sure you get your test results.  · You may resume your normal activities.  Summary  · A mammogram is an X-ray of the breasts that is done to check for abnormal changes. This procedure can screen for and detect any changes that may suggest breast cancer.  · If you have had a mammogram done at a different facility in the past, get the mammogram X-rays or have them sent to your current exam facility in order to compare them.  · Ask when your test results will be ready. Make sure you get your test results.  This information is not intended to replace advice given to you by your health care provider. Make sure you discuss any questions you have with your health care provider.  Document  Released: 12/15/2001 Document Revised: 08/02/2018 Document Reviewed: 02/26/2016  [employer] Interactive Patient Education © 2019 [employer]      Colorectal Cancer Screening    Colorectal cancer screening is a group of tests that are used to check for colorectal cancer before symptoms develop. Colorectal refers to the colon and rectum. The colon and rectum are located at the end of the digestive tract and carry bowel movements out of the body.  Who should have screening?  All adults starting at age 50 until age 75 should have screening. Your health care provider may recommend screening at age 45. You will have tests every 1-10 years, depending on your results and the type of screening test.  You may have screening tests starting at an earlier age, or more frequently than other people, if you have any of the following risk factors:  · A personal or family history of colorectal cancer or abnormal growths (polyps).  · Inflammatory bowel disease, such as ulcerative colitis or Crohn's disease.  · A history of having radiation treatment to the abdomen or pelvic area for cancer.  · Colorectal cancer symptoms, such as changes in bowel habits or blood in your stool.  · A type of colon cancer syndrome that is passed from parent to child (hereditary), such as:  ? Dove syndrome.  ? Familial adenomatous polyposis.  ? Turcot syndrome.  ? Peutz-Jeghers syndrome.  Screening recommendations for adults who are 75-85 years old vary depending on health.  How is screening done?  There are several types of colorectal screening tests. You may have one or more of the following:  · Guaiac-based fecal occult blood testing. For this test, a stool (feces) sample is checked for hidden (occult) blood, which could be a sign of colorectal cancer.  · Fecal immunochemical test (FIT). For this test, a stool sample is checked for blood, which could be a sign of colorectal cancer.  · Stool DNA test. For this test, a stool sample is checked for blood  and changes in DNA that could lead to colorectal cancer.  · Sigmoidoscopy. During this test, a thin, flexible tube with a camera on the end (sigmoidoscope) is used to examine the rectum and the lower colon.  · Colonoscopy. During this test, a long, flexible tube with a camera on the end (colonoscope) is used to examine the entire colon and rectum. With a colonoscopy, it is possible to take a sample of tissue (biopsy) and remove small polyps during the test.  · Virtual colonoscopy. Instead of a colonoscope, this type of colonoscopy uses X-rays (CT scan) and computers to produce images of the colon and rectum.  What are the benefits of screening?  Screening reduces your risk for colorectal cancer and can help identify cancer at an early stage, when the cancer can be removed or treated more easily. It is common for polyps to form in the lining of the colon, especially as you age. These polyps may be cancerous or become cancerous over time. Screening can identify these polyps.  What are the risks of screening?  Each screening test may have different risks.  · Stool sample tests have fewer risks than other types of screening tests. However, you may need more tests to confirm results from a stool sample test.  · Screening tests that involve X-rays expose you to low levels of radiation, which may slightly increase your cancer risk. The benefit of detecting cancer outweighs the slight increase in risk.  · Screening tests such as sigmoidoscopy and colonoscopy may place you at risk for bleeding, intestinal damage, infection, or a reaction to medicines given during the exam.  Talk with your health care provider to understand your risk for colorectal cancer and to make a screening plan that is right for you.  Questions to ask your health care provider  · When should I start colorectal cancer screening?  · What is my risk for colorectal cancer?  · How often do I need screening?  · Which screening tests do I need?  · How do I get  my test results?  · What do my results mean?  Where to find more information  Learn more about colorectal cancer screening from:  · The American Cancer Society: www.cancer.org  · The National Cancer [place]: www.cancer.gov  Summary  · Colorectal cancer screening is a group of tests used to check for colorectal cancer before symptoms develop.  · Screening reduces your risk for colorectal cancer and can help identify cancer at an early stage, when the cancer can be removed or treated more easily.  · All adults starting at age 50 until age 75 should have screening. Your health care provider may recommend screening at age 45.  · You may have screening tests starting at an earlier age, or more frequently than other people, if you have certain risk factors.  · Talk with your health care provider to understand your risk for colorectal cancer and to make a screening plan that is right for you.  This information is not intended to replace advice given to you by your health care provider. Make sure you discuss any questions you have with your health care provider.  Document Released: 06/07/2011 Document Revised: 11/30/2018 Document Reviewed: 09/19/2018  [employer] Interactive Patient Education © 2019 [employer]      Lung Cancer Screening  A lung cancer screening is a test that checks for lung cancer. Lung cancer screening is done to look for lung cancer in its very early stages, before it spreads and becomes harder to treat and before symptoms appear. Finding cancer early improves the chances of successful treatment. It may save your life.  Should I be screened for lung cancer?  You should be screened for lung cancer if all of these apply:  · You currently smoke or you have quit smoking within the past 15 years.  · You are 55-74 years old. Screening may be recommended up to age 80 depending on your overall health and other factors.  · You are in good general health.  · You have a 30-pack-year smoking history.    To find  your pack-year history, multiply how many packages of cigarettes you smoked each day by the number of years you smoked. For example, if you smoked two packs of cigarettes each day for 15 years, your pack-year history is 30. If you are not sure what your pack-year smoking history is, ask your health care provider.  Screening may also be recommended if you are at high risk for the disease. You may be at high risk if:  · You have a family history of lung cancer.  · You have been exposed to asbestos.  · You have chronic obstructive pulmonary disease (COPD).  · You have a history of previous lung cancer.    How often should I be screened for lung cancer?  If you are at risk for lung cancer, it is recommended that you are screened once a year. The recommended screening test is a low-dose CT scan.  How can I lower my risk of lung cancer?  To lower your risk of developing lung cancer:  · If you smoke, stop smoking all tobacco products.  · Avoid secondhand smoke.  · Avoid exposure to radiation.  · Avoid exposure to radon gas. Have your home checked for radon regularly.  · Avoid things that cause cancer (carcinogens).  · Avoid living or working in places with high air pollution.    Where to find more information  Ask your health care provider about the risks and benefits of screening. More information and resources are available from these organizations:  · American Cancer Society (ACS): www.cancer.org  · American Lung Association: www.lung.org    Contact a health care provider if:  · You start to show symptoms of lung cancer, including:  ? Coughing that will not go away.  ? Wheezing.  ? Chest pain.  ? Coughing up blood.  ? Shortness of breath.  ? Weight loss that cannot be explained.  ? Constant fatigue.  Summary  · Lung cancer screening may find lung cancer before symptoms appear. Finding cancer early improves the chances of successful treatment. It may save your life.  · If you are at risk for lung cancer, it is  recommended that you are screened once a year. The recommended screening test is a low-dose CT scan.  · You can make lifestyle changes to lower your risk of lung cancer.  · Ask your health care provider about the risks and benefits of screening.  This information is not intended to replace advice given to you by your health care provider. Make sure you discuss any questions you have with your health care provider.  Document Released: 11/08/2017 Document Revised: 11/08/2017 Document Reviewed: 11/08/2017  [employer] Interactive Patient Education © 2019 [employer]

## 2019-09-13 LAB
BACTERIA SPEC AEROBE CULT: NO GROWTH
BACTERIA UR QL AUTO: ABNORMAL /HPF
HYALINE CASTS UR QL AUTO: ABNORMAL /LPF
MUCOUS THREADS URNS QL MICRO: ABNORMAL /HPF
RBC # UR: ABNORMAL /HPF
REF LAB TEST METHOD: ABNORMAL
SQUAMOUS #/AREA URNS HPF: ABNORMAL /HPF
WBC UR QL AUTO: ABNORMAL /HPF

## 2019-09-26 ENCOUNTER — PRIOR AUTHORIZATION (OUTPATIENT)
Dept: FAMILY MEDICINE CLINIC | Facility: CLINIC | Age: 59
End: 2019-09-26

## 2019-10-21 ENCOUNTER — OFFICE VISIT (OUTPATIENT)
Dept: FAMILY MEDICINE CLINIC | Facility: CLINIC | Age: 59
End: 2019-10-21

## 2019-10-21 VITALS
DIASTOLIC BLOOD PRESSURE: 70 MMHG | HEART RATE: 122 BPM | WEIGHT: 205 LBS | HEIGHT: 66 IN | TEMPERATURE: 98.7 F | SYSTOLIC BLOOD PRESSURE: 114 MMHG | OXYGEN SATURATION: 97 % | BODY MASS INDEX: 32.95 KG/M2

## 2019-10-21 DIAGNOSIS — R30.0 DYSURIA: ICD-10-CM

## 2019-10-21 DIAGNOSIS — J44.9 COPD MIXED TYPE (HCC): ICD-10-CM

## 2019-10-21 DIAGNOSIS — N31.9 NEUROGENIC DYSFUNCTION OF THE URINARY BLADDER: ICD-10-CM

## 2019-10-21 DIAGNOSIS — N30.91 CYSTITIS WITH HEMATURIA: Primary | ICD-10-CM

## 2019-10-21 DIAGNOSIS — E55.9 VITAMIN D DEFICIENCY: ICD-10-CM

## 2019-10-21 DIAGNOSIS — E78.2 MIXED HYPERLIPIDEMIA: ICD-10-CM

## 2019-10-21 LAB
BILIRUB BLD-MCNC: ABNORMAL MG/DL
CLARITY, POC: ABNORMAL
COLOR UR: ABNORMAL
GLUCOSE UR STRIP-MCNC: NEGATIVE MG/DL
KETONES UR QL: NEGATIVE
LEUKOCYTE EST, POC: ABNORMAL
NITRITE UR-MCNC: POSITIVE MG/ML
PH UR: 6 [PH] (ref 5–8)
PROT UR STRIP-MCNC: ABNORMAL MG/DL
RBC # UR STRIP: ABNORMAL /UL
SP GR UR: 1.03 (ref 1–1.03)
UROBILINOGEN UR QL: NORMAL

## 2019-10-21 PROCEDURE — 87088 URINE BACTERIA CULTURE: CPT | Performed by: PHYSICIAN ASSISTANT

## 2019-10-21 PROCEDURE — 87186 SC STD MICRODIL/AGAR DIL: CPT | Performed by: PHYSICIAN ASSISTANT

## 2019-10-21 PROCEDURE — 99214 OFFICE O/P EST MOD 30 MIN: CPT | Performed by: PHYSICIAN ASSISTANT

## 2019-10-21 PROCEDURE — 81003 URINALYSIS AUTO W/O SCOPE: CPT | Performed by: PHYSICIAN ASSISTANT

## 2019-10-21 PROCEDURE — 87086 URINE CULTURE/COLONY COUNT: CPT | Performed by: PHYSICIAN ASSISTANT

## 2019-10-21 RX ORDER — CIPROFLOXACIN 500 MG/1
500 TABLET, FILM COATED ORAL 2 TIMES DAILY
Qty: 20 TABLET | Refills: 0 | Status: SHIPPED | OUTPATIENT
Start: 2019-10-21 | End: 2020-02-17 | Stop reason: SDUPTHER

## 2019-10-21 NOTE — PROGRESS NOTES
"Subjective   Mary Alvares is a 59 y.o. female.       Chief Complaint -  dysuria    History of Present Illness -      Dysuria-  She complains of severe burning with urination x 2 months.  He has neurogenic bladder and self caths regularly.  She has a history of recurrent UTI's.    UA reviewed today    Hyperlipidemia- stable with diet and crestor  Lab Results   Component Value Date    CHOL 299 (H) 05/17/2018    CHLPL 350 (H) 08/03/2015    TRIG 239 (H) 05/17/2018    HDL 40 (L) 05/17/2018     (H) 05/17/2018     Vitamin d deficiency-  Stable with vit d supplementation    The following portions of the patient's history were reviewed and updated as appropriate: allergies, current medications, past family history, past medical history, past social history, past surgical history and problem list.    Review of Systems   Constitutional: Negative for activity change, appetite change and fever.   HENT: Negative for ear pain, sinus pressure and sore throat.    Eyes: Negative for pain and visual disturbance.   Respiratory: Negative for cough and chest tightness.    Cardiovascular: Negative for chest pain and palpitations.   Gastrointestinal: Negative for abdominal pain, constipation, diarrhea, nausea and vomiting.   Endocrine: Negative for polydipsia and polyuria.   Genitourinary: Positive for difficulty urinating and dysuria. Negative for frequency.   Musculoskeletal: Negative for back pain and myalgias.   Skin: Negative for color change and rash.   Allergic/Immunologic: Negative for food allergies and immunocompromised state.   Neurological: Negative for dizziness, syncope and headaches.   Hematological: Negative for adenopathy. Does not bruise/bleed easily.   Psychiatric/Behavioral: Negative for hallucinations and suicidal ideas. The patient is not nervous/anxious.        /70   Pulse (!) 122   Temp 98.7 °F (37.1 °C) (Oral)   Ht 167.6 cm (65.98\")   Wt 93 kg (205 lb)   SpO2 97%   BMI 33.10 kg/m²   Lab Results "   Component Value Date    WBC 6.84 05/17/2018    HGB 15.3 05/17/2018    HCT 47.9 (H) 05/17/2018    MCV 91.2 05/17/2018     05/17/2018       Physical Exam   Constitutional: She is oriented to person, place, and time. She appears well-developed and well-nourished.   HENT:   Head: Normocephalic and atraumatic.   Nose: Nose normal.   Mouth/Throat: Oropharynx is clear and moist.   Eyes: Conjunctivae and EOM are normal. Pupils are equal, round, and reactive to light.   Neck: Normal range of motion. Neck supple. No tracheal deviation present. No thyromegaly present.   Cardiovascular: Normal rate, regular rhythm, normal heart sounds and intact distal pulses.   No murmur heard.  Pulmonary/Chest: Effort normal and breath sounds normal. No respiratory distress. She has no wheezes.   Abdominal: Soft. Bowel sounds are normal. There is no tenderness. There is no guarding.   Musculoskeletal: Normal range of motion. She exhibits no edema or tenderness.   Ambulatory with limp   Lymphadenopathy:     She has no cervical adenopathy.   Neurological: She is alert and oriented to person, place, and time.   Skin: Skin is warm and dry. No rash noted.   Psychiatric: She has a normal mood and affect. Her behavior is normal.   Nursing note and vitals reviewed.      Assessment/Plan     Diagnoses and all orders for this visit:    Cystitis with hematuria  -     ciprofloxacin (CIPRO) 500 MG tablet; Take 1 tablet by mouth 2 (Two) Times a Day.  -     Urine Culture - Urine, Urine, Catheter In/Out; Future  -     Urine Culture - Urine, Urine, Catheter In/Out    Dysuria  Comments:  UA reviewed  Orders:  -     POCT urinalysis dipstick, automated    Neurogenic dysfunction of the urinary bladder  Comments:  continue to self cath with clean supplies    Mixed hyperlipidemia  Comments:  continue crestor    COPD mixed type (CMS/HCC)  Comments:  advised low cholesterol diet and continue crestor    Vitamin D deficiency  Comments:  continue vitamin d  supplementation                 This document has been electronically signed by:  Rupali Corbin PA-C

## 2019-10-23 LAB — BACTERIA SPEC AEROBE CULT: ABNORMAL

## 2019-12-16 ENCOUNTER — OFFICE VISIT (OUTPATIENT)
Dept: FAMILY MEDICINE CLINIC | Facility: CLINIC | Age: 59
End: 2019-12-16

## 2019-12-16 VITALS
OXYGEN SATURATION: 98 % | DIASTOLIC BLOOD PRESSURE: 72 MMHG | TEMPERATURE: 98.9 F | WEIGHT: 206 LBS | BODY MASS INDEX: 33.11 KG/M2 | SYSTOLIC BLOOD PRESSURE: 116 MMHG | HEIGHT: 66 IN | RESPIRATION RATE: 14 BRPM | HEART RATE: 116 BPM

## 2019-12-16 DIAGNOSIS — F17.200 CURRENT SMOKER: ICD-10-CM

## 2019-12-16 DIAGNOSIS — R68.89 FLU-LIKE SYMPTOMS: Primary | ICD-10-CM

## 2019-12-16 DIAGNOSIS — E78.2 MIXED HYPERLIPIDEMIA: ICD-10-CM

## 2019-12-16 DIAGNOSIS — J01.10 ACUTE NON-RECURRENT FRONTAL SINUSITIS: ICD-10-CM

## 2019-12-16 DIAGNOSIS — J30.2 CHRONIC SEASONAL ALLERGIC RHINITIS: ICD-10-CM

## 2019-12-16 DIAGNOSIS — Z00.00 HEALTHCARE MAINTENANCE: ICD-10-CM

## 2019-12-16 DIAGNOSIS — G36.0 NEUROMYELITIS OPTICA (HCC): ICD-10-CM

## 2019-12-16 DIAGNOSIS — Z87.440 H/O RECURRENT URINARY TRACT INFECTION: ICD-10-CM

## 2019-12-16 DIAGNOSIS — J44.9 COPD MIXED TYPE (HCC): ICD-10-CM

## 2019-12-16 DIAGNOSIS — N31.9 NEUROGENIC DYSFUNCTION OF THE URINARY BLADDER: ICD-10-CM

## 2019-12-16 DIAGNOSIS — I65.23 BILATERAL CAROTID ARTERY STENOSIS: ICD-10-CM

## 2019-12-16 DIAGNOSIS — M79.2 NEUROPATHIC PAIN: ICD-10-CM

## 2019-12-16 DIAGNOSIS — E55.9 VITAMIN D DEFICIENCY: ICD-10-CM

## 2019-12-16 LAB
EXPIRATION DATE: NORMAL
FLUAV AG NPH QL: NEGATIVE
FLUBV AG NPH QL: NEGATIVE
INTERNAL CONTROL: NORMAL
Lab: NORMAL

## 2019-12-16 PROCEDURE — 99214 OFFICE O/P EST MOD 30 MIN: CPT | Performed by: GENERAL PRACTICE

## 2019-12-16 PROCEDURE — 87804 INFLUENZA ASSAY W/OPTIC: CPT | Performed by: GENERAL PRACTICE

## 2019-12-16 RX ORDER — AZITHROMYCIN 250 MG/1
TABLET, FILM COATED ORAL
Qty: 6 TABLET | Refills: 0 | Status: SHIPPED | OUTPATIENT
Start: 2019-12-16 | End: 2020-09-16

## 2019-12-16 NOTE — PROGRESS NOTES
Subjective   Mary Alvares is a 59 y.o. female.     History of Present Illness     Upper Respiratory Tract Infection  Presents with the following symptoms : nasal congestion, postnasal drip, periorbital pressure, sore throat, bilateral ear fullness, cough and chills. Onset of symptoms was 3 days ago, and have been gradually worsening since that time. There is no history of any hoarse voice, hemoptysis, chest pain, shortness of breath, nausea, vomiting, diarrhea, rash and fever.  She is drinking plenty of fluids. Evaluation to date: none. Treatment to date: none.  She has a history of chronic allergic rhinitis and COPD.  She continues to smoke 1 pack/day    Neuromyelitis Optica  She complains of a persistent visual impairment associated with weakness of her gait and allodynia from the waist down.  She has a history of bladder dysfunction with urinary retention, recurrent urinary tract infections, and self catheterizations.  She denies any change in her symptoms. MRI of the brain performed on 8/27/18 was reported as showing chronic sinusitis, degenerative changes of the C spine, as well as an abnormal flow void in the left distal ICA consistent with simply slow flow or a stenosis. CTA performed on 3/27/2019 was reported as showing mild stenosis at the origin of the left common carotid artery and occlusion at the origin of the internal carotid artery.  Calcification with a focal 60% stenosis at the origin of the right internal carotid artery was also noted. She continues to deny any changes in her vision, strength or sensation and has had no problem talking or understanding what is said to her. She is LHD.  She underwent another neurology reassessment since last here with no apparent changes made in her management    Dyslipidemia  Compliance with treatment has been fair. The patient exercises occasionally. She is currently being prescribed the following medication for her dyslipidemia - rosuvastatin.  She denies any  side effects thus far.  She has had no recent labs    The following portions of the patient's history were reviewed and updated as appropriate: allergies, current medications, past medical history, past social history and problem list.    Review of Systems   Constitutional: Positive for chills and fatigue. Negative for appetite change, fever and unexpected weight change.   HENT: Positive for congestion, ear pain (bilateral fullness), postnasal drip, rhinorrhea, sinus pressure, sneezing and sore throat. Negative for voice change.    Eyes: Positive for visual disturbance.   Respiratory: Positive for cough. Negative for shortness of breath and wheezing.    Cardiovascular: Negative for chest pain, palpitations and leg swelling.   Gastrointestinal: Negative for abdominal pain, blood in stool, constipation, diarrhea, nausea and vomiting.   Genitourinary: Negative for difficulty urinating, dysuria, frequency, hematuria and urgency.   Musculoskeletal: Negative for arthralgias, back pain, joint swelling, myalgias and neck pain.   Skin: Negative for rash.   Neurological: Positive for weakness (mild generalized with gait instability) and numbness. Negative for tremors and headaches.   Psychiatric/Behavioral: Negative for dysphoric mood and sleep disturbance. The patient is not nervous/anxious.      Objective   Physical Exam   Constitutional: She is oriented to person, place, and time. No distress.   Bright and in fair spirits. Gait slow and cautious. Nasal congestion.  No apparent distress.  No pallor, cyanosis, diaphoresis, or jaundice.   HENT:   Head: Atraumatic.   Right Ear: Tympanic membrane, external ear and ear canal normal.   Left Ear: Tympanic membrane, external ear and ear canal normal.   Nose: Nose normal.   Mouth/Throat: Oropharynx is clear and moist. Mucous membranes are not pale and not cyanotic.   Eyes: Pupils are equal, round, and reactive to light. EOM are normal. No scleral icterus.   Neck: No JVD present.  Carotid bruit is not present. No tracheal deviation present. No thyromegaly present.   Cardiovascular: Normal rate, regular rhythm, S1 normal, S2 normal and intact distal pulses. Exam reveals no gallop, no S3 and no S4.   No murmur heard.  Pulmonary/Chest: No accessory muscle usage or stridor. No respiratory distress. She has decreased breath sounds in the right lower field and the left lower field. She has wheezes (mild diffuse).   Musculoskeletal: She exhibits no tenderness or deformity.     Vascular Status -  Her right foot exhibits no edema. Her left foot exhibits normal foot vasculature  and no edema.  Lymphadenopathy:        Head (right side): No submandibular adenopathy present.        Head (left side): No submandibular adenopathy present.     She has no cervical adenopathy.   Neurological: She is alert and oriented to person, place, and time. No cranial nerve deficit. She exhibits abnormal muscle tone (mild right-sided spasticity). Gait abnormal. Coordination normal.   Skin: Skin is warm and dry. No rash noted. She is not diaphoretic. No cyanosis. No pallor. Nails show no clubbing.   Psychiatric: She has a normal mood and affect.     Assessment/Plan   Problems Addressed this Visit        Cardiovascular and Mediastinum    Mixed hyperlipidemia  Encouraged to continue to work on her diet and exercise plan.  Continue current medication  Updated labs drawn.    Relevant Orders    Comprehensive Metabolic Panel    Lipid Panel    TSH    Bilateral carotid artery stenosis  Reminded regarding risk factor modification with an emphasis on tobacco cessation.  Continue current medication       Respiratory    COPD mixed type (CMS/HCC)   COPD is stable.  Reminded of the importance of smoking cessation  Encouraged to remain as active as symptoms allow for    Chronic seasonal allergic rhinitis    Acute frontal sinusitis  Advised regarding symptomatic treatment.  Antibiotic as per orders.  Encouraged to report if any worse or if  any new symptoms.  Call in 4 days if symptoms aren't resolving.   Relevant Orders   POCT Influenza A/B (Completed)       Relevant Medications    azithromycin (ZITHROMAX) 250 MG tablet    Other Relevant Orders    CBC & Differential       Digestive    Vitamin D deficiency  Continue supplementation with monitoring.    Relevant Orders    Vitamin D 25 Hydroxy       Nervous and Auditory    Neuropathic pain    Neuromyelitis optica (CMS/HCC)  Follow up with nephrology   Will try to obtain records       Genitourinary    Neurogenic dysfunction of the urinary bladder       Other    Current smoker    H/O recurrent urinary tract infection    Healthcare maintenance  Patient remains uninterested in any immunizations

## 2019-12-30 ENCOUNTER — TELEPHONE (OUTPATIENT)
Dept: FAMILY MEDICINE CLINIC | Facility: CLINIC | Age: 59
End: 2019-12-30

## 2019-12-30 NOTE — TELEPHONE ENCOUNTER
Requested records      ----- Message from Ben Packer sent at 12/20/2019  5:28 PM EST -----      ----- Message -----  From: Jacky Bob MD  Sent: 12/16/2019  12:15 PM EST  To: Ben Packer    Need records from her neurologist - most recent progress note and labs - dr klarissa pierson

## 2020-01-01 NOTE — TELEPHONE ENCOUNTER
PA requested for Rosuvastatin   Patient with discoordinated SSB pattern marked by rapid, continuous sucking bursts without pause for breath. Patient benefited from external pacing every 5 sucks. Patient with emerging self-pacing every 3-5 sucks as the feeding progressed. Patient with NO overt s/s penetration/aspiration or cardiopulmonary changes demonstrated. Recommend continuation of oral diet of formula dense liquids with external pacing every 5 sucks as tolerated by patient.

## 2020-02-17 ENCOUNTER — TELEPHONE (OUTPATIENT)
Dept: FAMILY MEDICINE CLINIC | Facility: CLINIC | Age: 60
End: 2020-02-17

## 2020-02-17 DIAGNOSIS — N30.91 CYSTITIS WITH HEMATURIA: ICD-10-CM

## 2020-02-17 RX ORDER — CIPROFLOXACIN 500 MG/1
500 TABLET, FILM COATED ORAL 2 TIMES DAILY
Qty: 20 TABLET | Refills: 0 | Status: SHIPPED | OUTPATIENT
Start: 2020-02-17 | End: 2020-03-27 | Stop reason: SDUPTHER

## 2020-02-25 ENCOUNTER — TELEPHONE (OUTPATIENT)
Dept: FAMILY MEDICINE CLINIC | Facility: CLINIC | Age: 60
End: 2020-02-25

## 2020-02-25 NOTE — TELEPHONE ENCOUNTER
Tried to call the patient back multiple times. Unable to reach her. Did leave a message with the patient's  to have her call us.       ----- Message from Jacky Bob MD sent at 2/19/2020  3:58 PM EST -----  How is she taking it at present?    ----- Message -----  From: Luli Domínguez MA  Sent: 2/19/2020   3:18 PM EST  To: Jacky Bob MD    [facility] called and stated that ramon is still having a lot of shoulder pain with the crestor. She was wanting to know if you could change the medicine to something different or possibly lower the dose.

## 2020-03-06 RX ORDER — AZATHIOPRINE 50 MG/1
50 TABLET ORAL 4 TIMES DAILY
Qty: 120 TABLET | Refills: 5 | Status: SHIPPED | OUTPATIENT
Start: 2020-03-06 | End: 2020-10-26

## 2020-03-27 ENCOUNTER — TELEPHONE (OUTPATIENT)
Dept: FAMILY MEDICINE CLINIC | Facility: CLINIC | Age: 60
End: 2020-03-27

## 2020-03-27 DIAGNOSIS — N30.91 CYSTITIS WITH HEMATURIA: ICD-10-CM

## 2020-03-27 RX ORDER — CIPROFLOXACIN 500 MG/1
500 TABLET, FILM COATED ORAL 2 TIMES DAILY
Qty: 20 TABLET | Refills: 0 | Status: SHIPPED | OUTPATIENT
Start: 2020-03-27 | End: 2020-09-16

## 2020-03-27 NOTE — TELEPHONE ENCOUNTER
Pt is aware of this information.       ----- Message from Jacky Bob MD sent at 3/27/2020 11:22 AM EDT -----  Emailed but if she is able to leave a urine for analysis and culture before starting it this would be helpful as it might be a bacteria resistant to cipro (in which case she is going to keep having probs)  ----- Message -----  From: Luli Domínguez MA  Sent: 3/27/2020  11:19 AM EDT  To: Jacky Bob MD    Patient wants to know if you could send her in another prescription for ciprofloxacin (CIPRO) 500 MG tablet . She has another UTI.

## 2020-05-20 DIAGNOSIS — M79.2 NEUROPATHIC PAIN: ICD-10-CM

## 2020-05-20 DIAGNOSIS — G36.0 NEUROMYELITIS OPTICA (HCC): ICD-10-CM

## 2020-05-20 RX ORDER — TRAMADOL HYDROCHLORIDE 50 MG/1
50 TABLET ORAL 4 TIMES DAILY
Qty: 120 TABLET | Refills: 2 | Status: SHIPPED | OUTPATIENT
Start: 2020-05-20 | End: 2020-09-03

## 2020-05-20 RX ORDER — CLOPIDOGREL BISULFATE 75 MG/1
75 TABLET ORAL DAILY
Qty: 30 TABLET | Refills: 11 | Status: SHIPPED | OUTPATIENT
Start: 2020-05-20 | End: 2021-01-01

## 2020-05-20 RX ORDER — GABAPENTIN 600 MG/1
1200 TABLET ORAL 3 TIMES DAILY
Qty: 180 TABLET | Refills: 2 | Status: SHIPPED | OUTPATIENT
Start: 2020-05-20 | End: 2020-11-16

## 2020-07-13 RX ORDER — LANOLIN ALCOHOL/MO/W.PET/CERES
1000 CREAM (GRAM) TOPICAL DAILY
Qty: 30 TABLET | Refills: 5 | Status: SHIPPED | OUTPATIENT
Start: 2020-07-13

## 2020-08-03 DIAGNOSIS — G36.0 NEUROMYELITIS OPTICA (HCC): ICD-10-CM

## 2020-08-03 DIAGNOSIS — M79.2 NEUROPATHIC PAIN: ICD-10-CM

## 2020-08-03 RX ORDER — DIAZEPAM 5 MG/1
TABLET ORAL
Qty: 60 TABLET | Refills: 0 | Status: SHIPPED | OUTPATIENT
Start: 2020-08-03 | End: 2020-09-03

## 2020-09-03 DIAGNOSIS — G36.0 NEUROMYELITIS OPTICA (HCC): ICD-10-CM

## 2020-09-03 DIAGNOSIS — M79.2 NEUROPATHIC PAIN: ICD-10-CM

## 2020-09-03 RX ORDER — DIAZEPAM 5 MG/1
TABLET ORAL
Qty: 60 TABLET | Refills: 0 | Status: SHIPPED | OUTPATIENT
Start: 2020-09-03 | End: 2020-11-16

## 2020-09-03 RX ORDER — TRAMADOL HYDROCHLORIDE 50 MG/1
TABLET ORAL
Qty: 120 TABLET | Refills: 0 | Status: SHIPPED | OUTPATIENT
Start: 2020-09-03 | End: 2020-10-29

## 2020-09-16 ENCOUNTER — OFFICE VISIT (OUTPATIENT)
Dept: FAMILY MEDICINE CLINIC | Facility: CLINIC | Age: 60
End: 2020-09-16

## 2020-09-16 DIAGNOSIS — J01.40 ACUTE PANSINUSITIS, RECURRENCE NOT SPECIFIED: Primary | ICD-10-CM

## 2020-09-16 PROCEDURE — G2025 DIS SITE TELE SVCS RHC/FQHC: HCPCS | Performed by: NURSE PRACTITIONER

## 2020-09-16 RX ORDER — AZITHROMYCIN 250 MG/1
TABLET, FILM COATED ORAL
Qty: 6 TABLET | Refills: 0 | Status: SHIPPED | OUTPATIENT
Start: 2020-09-16 | End: 2020-09-28 | Stop reason: SDUPTHER

## 2020-09-16 NOTE — PROGRESS NOTES
You have chosen to receive care through a telephone visit. Do you consent to use a telephone visit for your medical care today? Yes  Mary Alvares is a 60 y.o. female who is c/o upper respiratory symptoms which started within the past seven days and worsening. She does exposure to COVID and has basically been self quarantined due to her Immunocompromise status.     URI   This is a new problem. The current episode started in the past 7 days. The problem has been rapidly worsening. There has been no fever. Associated symptoms include congestion, ear pain, joint swelling, rhinorrhea and sinus pain. Pertinent negatives include no chest pain, coughing, diarrhea, dysuria, headaches, nausea, plugged ear sensation, rash, sneezing, sore throat, swollen glands, vomiting or wheezing. She has tried acetaminophen and steam for the symptoms. The treatment provided mild relief.        The following portions of the patient's history were reviewed and updated as appropriate: allergies, current medications, past family history, past medical history, past social history, past surgical history and problem list.    Current Outpatient Medications:   •  aspirin 81 MG chewable tablet, Chew 81 mg Daily., Disp: , Rfl:   •  azaTHIOprine (IMURAN) 50 MG tablet, Take 1 tablet by mouth 4 (Four) Times a Day., Disp: 120 tablet, Rfl: 5  •  baclofen (LIORESAL) 20 MG tablet, TAKE 1 TABLET BY MOUTH FOUR TIMES DAILY, Disp: 120 tablet, Rfl: 5  •  clopidogrel (PLAVIX) 75 MG tablet, Take 1 tablet by mouth Daily., Disp: 30 tablet, Rfl: 11  •  diazePAM (VALIUM) 5 MG tablet, TAKE 1/2 TABLET BY MOUTH EVERY MORNING AND 1 1/2 TABLET BY MOUTH EVERY NIGHT AT BEDTIME, Disp: 60 tablet, Rfl: 0  •  gabapentin (NEURONTIN) 600 MG tablet, Take 2 tablets by mouth 3 (Three) Times a Day., Disp: 180 tablet, Rfl: 2  •  rosuvastatin (CRESTOR) 20 MG tablet, Take 1 tablet by mouth Every Night., Disp: 30 tablet, Rfl: 5  •  traMADol (ULTRAM) 50 MG tablet, TAKE 1 TABLET BY MOUTH  FOUR TIMES DAILY, Disp: 120 tablet, Rfl: 0  •  vitamin B-12 (CYANOCOBALAMIN) 1000 MCG tablet, Take 1 tablet by mouth Daily., Disp: 30 tablet, Rfl: 5  •  vitamin D (ERGOCALCIFEROL) 56219 units capsule capsule, Take 50,000 Units by mouth 1 (One) Time Per Week., Disp: , Rfl: 11  •  azithromycin (Zithromax) 250 MG tablet, Take 2 tablets the first day, then 1 tablet daily for 4 days., Disp: 6 tablet, Rfl: 0    Allergies   Allergen Reactions   • Levaquin [Levofloxacin]    • Morphine And Related GI Intolerance       Review of Systems   Constitutional: Positive for activity change. Negative for appetite change, chills, fatigue, fever and unexpected weight change.   HENT: Positive for congestion, ear pain, postnasal drip, rhinorrhea, sinus pressure, sinus pain and voice change. Negative for facial swelling, sneezing, sore throat and tinnitus.         No smell or taste change   Eyes: Negative for visual disturbance.   Respiratory: Negative for cough, shortness of breath and wheezing.    Cardiovascular: Negative for chest pain, palpitations and leg swelling.   Gastrointestinal: Negative for constipation, diarrhea, nausea and vomiting.   Endocrine: Negative for cold intolerance, heat intolerance, polydipsia, polyphagia and polyuria.   Genitourinary: Negative for dysuria.   Skin: Negative for color change and rash.   Allergic/Immunologic: Positive for immunocompromised state.   Neurological: Negative for dizziness, tremors, speech difficulty, weakness, light-headedness and headaches.   Hematological: Negative for adenopathy.   Psychiatric/Behavioral: Negative for confusion, decreased concentration and suicidal ideas. The patient is not nervous/anxious.    All other systems reviewed and are negative.    There were no vitals taken for this visit.    Physical Exam  Constitutional:       General: She is not in acute distress.     Appearance: She is well-developed.      Comments: Voice is hoarse   Neurological:      Mental Status:  She is alert and oriented to person, place, and time.   Psychiatric:         Mood and Affect: Mood normal.         Behavior: Behavior is cooperative.       Assessment/Plan   Diagnoses and all orders for this visit:    Acute pansinusitis, recurrence not specified  -     azithromycin (Zithromax) 250 MG tablet; Take 2 tablets the first day, then 1 tablet daily for 4 days.      Symptoms discussed with Mary. Treatment options reviewed. She reports she generally responds very well to Zithromax. She has not had in greater than six months. Counseled regarding supportive care measures. Encouraged her to seek further medical evaluation if symptoms worsen or do not improve within 48-72 hours.  This visit has been scheduled as a phone visit to comply with patient safety concerns in accordance with CDC recommendations. Total time of discussion was 15  minutes.    This document has been electronically signed by SHILA Mcdaniel, CHUCK-BC, SON  September 16, 2020 19:23 EDT

## 2020-09-28 ENCOUNTER — TELEPHONE (OUTPATIENT)
Dept: FAMILY MEDICINE CLINIC | Facility: CLINIC | Age: 60
End: 2020-09-28

## 2020-09-28 DIAGNOSIS — J01.40 ACUTE PANSINUSITIS, RECURRENCE NOT SPECIFIED: ICD-10-CM

## 2020-09-28 RX ORDER — AZITHROMYCIN 250 MG/1
TABLET, FILM COATED ORAL
Qty: 6 TABLET | Refills: 0 | Status: SHIPPED | OUTPATIENT
Start: 2020-09-28 | End: 2020-11-05

## 2020-09-28 NOTE — TELEPHONE ENCOUNTER
Pt is aware of this information.       ----- Message from Jacky Bob MD sent at 9/28/2020  1:09 PM EDT -----  Emailed to kasandra  ----- Message -----  From: Luli Domínguez MA  Sent: 9/28/2020   1:00 PM EDT  To: Jacky Bob MD    Pt called and stated that she has another sinus infection and wanted to know if you could send her in a z-jeni?

## 2020-10-26 RX ORDER — AZATHIOPRINE 50 MG/1
TABLET ORAL
Qty: 120 TABLET | Refills: 5 | Status: SHIPPED | OUTPATIENT
Start: 2020-10-26 | End: 2021-01-01 | Stop reason: SDUPTHER

## 2020-10-29 DIAGNOSIS — G36.0 NEUROMYELITIS OPTICA (HCC): ICD-10-CM

## 2020-10-29 DIAGNOSIS — M79.2 NEUROPATHIC PAIN: ICD-10-CM

## 2020-10-29 RX ORDER — TRAMADOL HYDROCHLORIDE 50 MG/1
TABLET ORAL
Qty: 120 TABLET | Refills: 0 | Status: SHIPPED | OUTPATIENT
Start: 2020-10-29 | End: 2021-01-01

## 2020-11-04 RX ORDER — CIPROFLOXACIN 500 MG/1
500 TABLET, FILM COATED ORAL 2 TIMES DAILY
Qty: 20 TABLET | Refills: 0 | Status: SHIPPED | OUTPATIENT
Start: 2020-11-04 | End: 2020-11-14

## 2020-11-05 ENCOUNTER — OFFICE VISIT (OUTPATIENT)
Dept: UROLOGY | Facility: CLINIC | Age: 60
End: 2020-11-05

## 2020-11-05 VITALS — WEIGHT: 201 LBS | BODY MASS INDEX: 32.3 KG/M2 | HEIGHT: 66 IN | TEMPERATURE: 97.6 F

## 2020-11-05 DIAGNOSIS — N30.40 CHRONIC RADIATION CYSTITIS: ICD-10-CM

## 2020-11-05 DIAGNOSIS — R31.9 URINARY TRACT INFECTION WITH HEMATURIA, SITE UNSPECIFIED: ICD-10-CM

## 2020-11-05 DIAGNOSIS — N39.42 URINARY INCONTINENCE WITHOUT SENSORY AWARENESS: ICD-10-CM

## 2020-11-05 DIAGNOSIS — N31.9 NEUROGENIC DYSFUNCTION OF THE URINARY BLADDER: Primary | ICD-10-CM

## 2020-11-05 DIAGNOSIS — N39.0 URINARY TRACT INFECTION WITH HEMATURIA, SITE UNSPECIFIED: ICD-10-CM

## 2020-11-05 DIAGNOSIS — R35.0 FREQUENCY OF URINATION: ICD-10-CM

## 2020-11-05 LAB
BILIRUB BLD-MCNC: NEGATIVE MG/DL
CLARITY, POC: ABNORMAL
COLOR UR: YELLOW
GLUCOSE UR STRIP-MCNC: NEGATIVE MG/DL
KETONES UR QL: NEGATIVE
LEUKOCYTE EST, POC: ABNORMAL
NITRITE UR-MCNC: POSITIVE MG/ML
PH UR: 7 [PH] (ref 5–8)
PROT UR STRIP-MCNC: ABNORMAL MG/DL
RBC # UR STRIP: ABNORMAL /UL
SP GR UR: 1.01 (ref 1–1.03)
UROBILINOGEN UR QL: NORMAL

## 2020-11-05 PROCEDURE — 81003 URINALYSIS AUTO W/O SCOPE: CPT | Performed by: NURSE PRACTITIONER

## 2020-11-05 PROCEDURE — 87086 URINE CULTURE/COLONY COUNT: CPT | Performed by: NURSE PRACTITIONER

## 2020-11-05 PROCEDURE — 99204 OFFICE O/P NEW MOD 45 MIN: CPT | Performed by: NURSE PRACTITIONER

## 2020-11-05 RX ORDER — NITROFURANTOIN 25; 75 MG/1; MG/1
100 CAPSULE ORAL DAILY
Qty: 56 CAPSULE | Refills: 3 | Status: SHIPPED | OUTPATIENT
Start: 2020-11-05 | End: 2020-12-04

## 2020-11-05 RX ORDER — ONDANSETRON 4 MG/1
4 TABLET, FILM COATED ORAL EVERY 12 HOURS PRN
Qty: 20 TABLET | Refills: 0 | Status: SHIPPED | OUTPATIENT
Start: 2020-11-05 | End: 2020-12-04

## 2020-11-05 NOTE — PROGRESS NOTES
Chief Complaint:          Chief Complaint   Patient presents with   • Recurrent UTI /Neurogenic Bladder     New Patient with Recurrent UTIs /Urine Incontinence /Rectocele       HPI:   60 y.o. female.  Patient Presents to clinic with her daughter today for evaluation.  She reports numerous concerns: Neurogenic bladder, urine incontinence, UTI, rectocele.    She has been referred by her primary care with concerns about recurrent UTI'S. She reports the numerous use of antibiotics in the last few months for UTI,  However she is unsure about any recent documented positive urine culture.  Her last positive documented urine culture was for E. coli on October 21, 2019.  Patient is requesting levofloxacin to treat her current symptoms.  She reports she recently completed a round of Macrobid by Dr. Teajda -OB/GYN which have not been helpful in alleviating her symptoms.  Patient is requesting ciprofloxacin as that is the only antibiotic that works for her.    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  Patient reports this has been ongoing for many years, and she has done self intermittent catheterization the past 15 years.      However, she reports this is increasingly getting difficult with numerous incontinent episodes for the last 7 months.  She reports urine frequency, pelvic pain, pressure and bladder pain/discomfort. She reports abdominal pain, back pain and CVA tenderness. She has dysuria most times, urgency, nocturia. She denies any episodes of Gross hematuria. No  Fevers/chills, she has nausea, no /V/D. She has issues with severe constipation and reports currently taking probiotics.    Today, she  has a very foul/pungent/odorous millicent-colored urine.  Her Urine dipstick today showed 3+ leukocyte Estrace, positive nitrites, 2+ proteinuria, 3+ microscopic hematuria. She reports seeing OBGYN -Dr. Parvez Tejada recently with recommendations for surgical repair of her rectocele.    She is a G2, P3, with significant history of cervical cancer status post radiation/chemotherapy in 2003.  We discussed radiation cystitis, patient is a 1 pack/day smoker for the last 40 years and continues to smoke.  She has been encouraged to follow up with GI.     Her medical history as listed below.    Past Medical History:        Past Medical History:   Diagnosis Date   • Acute cystitis    • Hyperlipidemia    • Neurogenic dysfunction of the urinary bladder    • Neuromyelitis optica (CMS/HCC)    • Neuropathic pain    • Vitamin D deficiency      The following portions of the patient's history were reviewed and updated as appropriate: allergies, current medications, past family history, past medical history, past social history, past surgical history and problem list.    Current Meds:     Current Outpatient Medications   Medication Sig Dispense Refill   • aspirin 81 MG chewable tablet Chew 81 mg Daily.     • azaTHIOprine (IMURAN) 50 MG tablet TAKE 1 TABLET BY MOUTH FOUR TIMES DAILY 120 tablet 5   • baclofen (LIORESAL) 20 MG tablet TAKE 1 TABLET BY MOUTH FOUR TIMES DAILY 120 tablet 5   • ciprofloxacin (CIPRO) 500 MG tablet Take 1 tablet by mouth 2 (Two) Times a Day for 10 days. 20 tablet 0   • clopidogrel (PLAVIX) 75 MG tablet Take 1 tablet by mouth Daily. 30 tablet 11   • diazePAM (VALIUM) 5 MG tablet TAKE 1/2 TABLET BY MOUTH EVERY MORNING AND 1 1/2 TABLET BY MOUTH EVERY NIGHT AT BEDTIME 60 tablet 0   • gabapentin (NEURONTIN) 600 MG tablet Take 2 tablets by mouth 3 (Three) Times a Day. 180 tablet 2   • rosuvastatin (CRESTOR) 20 MG tablet Take 1 tablet by mouth Every Night. 30 tablet 5   • traMADol (ULTRAM) 50 MG tablet TAKE 1 TABLET BY  MOUTH FOUR TIMES DAILY 120 tablet 0   • vitamin B-12 (CYANOCOBALAMIN) 1000 MCG tablet Take 1 tablet by mouth Daily. 30 tablet 5   • vitamin D (ERGOCALCIFEROL) 31061 units capsule capsule Take 50,000 Units by mouth 1 (One) Time Per Week.  11   • nitrofurantoin, macrocrystal-monohydrate, (Macrobid) 100 MG capsule Take 1 capsule by mouth Daily. 56 capsule 3   • ondansetron (Zofran) 4 MG tablet Take 1 tablet by mouth Every 12 (Twelve) Hours As Needed for Nausea or Vomiting. 20 tablet 0     No current facility-administered medications for this visit.         Allergies:      Allergies   Allergen Reactions   • Levaquin [Levofloxacin]    • Morphine And Related GI Intolerance        Past Surgical History:     Past Surgical History:   Procedure Laterality Date   • HYSTERECTOMY           Social History:     Social History     Socioeconomic History   • Marital status:      Spouse name: elvin   • Number of children: 2   • Years of education: 11   • Highest education level: Not on file   Occupational History   • Occupation: disabled   Social Needs   • Financial resource strain: Patient refused   • Food insecurity     Worry: Patient refused     Inability: Patient refused   • Transportation needs     Medical: Patient refused     Non-medical: Patient refused   Tobacco Use   • Smoking status: Current Every Day Smoker     Packs/day: 1.00     Types: Cigarettes   • Smokeless tobacco: Never Used   Substance and Sexual Activity   • Alcohol use: No   • Drug use: No   • Sexual activity: Defer   Lifestyle   • Physical activity     Days per week: Patient refused     Minutes per session: Patient refused   • Stress: Patient refused       Family History:     Family History   Problem Relation Age of Onset   • Cancer Father    • Cancer Mother        Review of Systems:     Review of Systems   Constitutional: Positive for activity change and fatigue. Negative for chills and fever.   HENT: Positive for sinus pressure. Negative for congestion.      Eyes: Negative for blurred vision, pain and itching.   Respiratory: Negative for chest tightness and shortness of breath.    Cardiovascular: Negative for chest pain.   Gastrointestinal: Positive for abdominal distention, abdominal pain, constipation and nausea. Negative for diarrhea and vomiting.   Endocrine: Negative for heat intolerance.   Genitourinary: Positive for dysuria, flank pain, frequency, hematuria, pelvic pain, pelvic pressure, urgency and urinary incontinence. Negative for difficulty urinating, dyspareunia, genital sores and vaginal discharge.   Musculoskeletal: Positive for back pain, gait problem and myalgias.   Skin: Positive for pallor. Negative for rash.   Allergic/Immunologic: Negative for food allergies.   Neurological: Negative for dizziness, headache and confusion.   Hematological: Does not bruise/bleed easily.   Psychiatric/Behavioral: Positive for stress. Negative for behavioral problems and decreased concentration. The patient is nervous/anxious.         Physical Exam:     Physical Exam  Constitutional:       General: She is in acute distress.   Abdominal:      General: There is distension.      Tenderness: There is abdominal tenderness.   Genitourinary:     Comments: Soft nontender abdomen with no organomegaly, rigidity, guarding or tenderness.  Normal vaginal orifice.  She has  Significant leakage with Valsalva.grade 2 rectocele,  No significant perineal body abnormalities and a normal external anus.     Musculoskeletal:         General: Tenderness present.   Skin:     Capillary Refill: Capillary refill takes less than 2 seconds.   Psychiatric:         Behavior: Behavior normal.           Procedure:       Assessment/Plan:        ASSESSMENT  Recurrent UTI's/Microscopic Hematuria/Neurogenic bladder/Radiation Cystitis: Patient has been  diagnosed with Recurrent urinary tract infections, neurogenic bladder and  referred to us by her primary care. She is very symptomatic today and reports  feeling Unwell. She recently finished antibiotics Macrobid for her most recent UTI.   She is demanding ciprofloxacin for her current symptoms.  Urine dipstick today showed 3+ leukocyte esterase, positive nitrites, 3+ microscopic hematuria.    We also Discussed Microscopic VS Gross Hematuria with patient. We discussed the possible causes such as infection in the bladder, kidney, or bladder discomfort, trauma. vigorous exercise, different kinds of cancers, viral illness, such as hepatitis a virus that causes liver disease and inflammation of the liver.    We discussed radiation cystitis, her recurrent UTIs, the types of organisms that are found in the urinary tract indicating that the vast majority are results of the patient's own gastrointestinal zoë.  We discussed how many of the antibiotics that are utilized can actually exacerbate these infections by creating resistant organisms and there is only a very few antibiotics that are concentrated in the urine -Macrobid and do not affect the rectal reservoir nor cause recurrent yeast vaginitis.      We discussed the risk factors for recurrent infections being intercourse in younger patients and atrophic changes in older patients.  We discussed the symptoms that are found including pain, pressure, burning, frequency, urgency suprapubic pain .  Patient has neurogenic bladder and does self intermittent caths daily.  She also has incontinent episodes an hour wears pads on a daily basis.    I discussed upper tract symptoms including fevers, chills, and indicated the workup would be much more aggressive if the patient were to present with recurrent infections in the face of upper tract symptomatology such as fever.        PLAN  We sent her urine for culture, I will call her with results if bacterial growth is resistant to current therapy of Ciprofloxacin 500 mg p.o. twice daily x10 days per Dr. Langford.    We discussed antibiotic suppressive therapy with Macrobid 100 mg  daily after completing her current antibiotic therapy with Cipro.     I strongly recommend concomitant probiotics with treatment with antibiotics to protect the rectal reservoir including over-the-counter yogurt preparations to sunday oral pills containing the appropriate probiotics.    Zofran 0.4 mg as needed for nausea    Continue Myrbetriq as ordered per primary care-Urine Frequency and Urine  Incontinence-Detrusor instability    Discussed follow-up with OB/GYN Dr. Terrell Hannon for Rectocele repair.     We discussed the use of both an upper and lower tract investigation.    I discussed the fact that an upper tract investigation includes the CT scan with contrast being the gold standard to diagnose the small neoplasms.      We discussed the lower tract investigation consisting of a cystoscopy. Patient has been scheduled for cystoscopy on January 11, 2020 with Dr. Sparks.    We will see her in 2 weeks and review her CT scan results.    Patient /daughter are agreeable with plan of care    Patient reports that she is not currently experiencing any symptoms of urinary incontinence.    Patient's Body mass index is 32.44 kg/m². BMI is above normal parameters. Recommendations include: educational material, exercise counseling and nutrition counseling.    Smoking Cessation Counseling:  Current every day smoker. less than 3 minutes spent counseling. Will try to cut down.  I advised patient to quit tobacco use and offered support.  I provided patient with tobacco cessation educational material printed in the patient's After Visit Summary.     Counseling was given to patient and family for the following topics diagnostic results including: Recurrent UTIs, urine incontinence, neurogenic bladder, radiation cystitis., instructions for management as follows: Increase p.o. fluid intake, Cipro 500 mg twice daily, start suppressive therapy Macrobid after, Zofran as needed, continue Myrbetriq., risk factor reductions including:  Smoking cessation, bladder irritants such as caffeine products, coffee, citrusy/spicy foods. and impressions as follows: CT abdomen and pelvis, scheduled for cystoscopy 01/11/2021 with Dr. Stephens. The interim medical history and current results were reviewed.  A treatment plan with follow-up was made FOR: RECURRENT UTIS, Neurogenic dysfunction of the urinary bladder [N31.9]            This document has been electronically signed by Griselda Cheng-Akwa, APRN November 11, 2020 20:36 EST

## 2020-11-06 LAB — BACTERIA SPEC AEROBE CULT: NO GROWTH

## 2020-11-09 ENCOUNTER — TELEPHONE (OUTPATIENT)
Dept: UROLOGY | Facility: CLINIC | Age: 60
End: 2020-11-09

## 2020-11-10 NOTE — TELEPHONE ENCOUNTER
CALLED PATIENT TO CHECK ON HER POST CLINIC VISIT, AND TO DISCUSS URINE CULTURE RESULTS  WERE NEGATIVE FOR ANY GROWTH.     SHE SHOULD CONTINUE SUPPRESSIVE THERAPY AS DISCUSSED. PATIENT REPORTS DOING WELL AT THIS TIME AND WILL FOLLOW UP AS DISCUSSED.

## 2020-11-12 NOTE — PATIENT INSTRUCTIONS
Steps to Quit Smoking  Smoking tobacco is the leading cause of preventable death. It can affect almost every organ in the body. Smoking puts you and those around you at risk for developing many serious chronic diseases. Quitting smoking can be difficult, but it is one of the best things that you can do for your health. It is never too late to quit.  How do I get ready to quit?  When you decide to quit smoking, create a plan to help you succeed. Before you quit:  · Pick a date to quit. Set a date within the next 2 weeks to give you time to prepare.  · Write down the reasons why you are quitting. Keep this list in places where you will see it often.  · Tell your family, friends, and co-workers that you are quitting. Support from your loved ones can make quitting easier.  · Talk with your health care provider about your options for quitting smoking.  · Find out what treatment options are covered by your health insurance.  · Identify people, places, things, and activities that make you want to smoke (triggers). Avoid them.  What first steps can I take to quit smoking?  · Throw away all cigarettes at home, at work, and in your car.  · Throw away smoking accessories, such as ashtrays and lighters.  · Clean your car. Make sure to empty the ashtray.  · Clean your home, including curtains and carpets.  What strategies can I use to quit smoking?  Talk with your health care provider about combining strategies, such as taking medicines while you are also receiving in-person counseling. Using these two strategies together makes you more likely to succeed in quitting than if you used either strategy on its own.  · If you are pregnant or breastfeeding, talk with your health care provider about finding counseling or other support strategies to quit smoking. Do not take medicine to help you quit smoking unless your health care provider tells you to do so.  To quit smoking:  Quit right away  · Quit smoking completely, instead of  gradually reducing how much you smoke over a period of time. Research shows that stopping smoking right away is more successful than gradually quitting.  · Attend in-person counseling to help you build problem-solving skills. You are more likely to succeed in quitting if you attend counseling sessions regularly. Even short sessions of 10 minutes can be effective.  Take medicine  You may take medicines to help you quit smoking. Some medicines require a prescription and some you can purchase over-the-counter. Medicines may have nicotine in them to replace the nicotine in cigarettes. Medicines may:  · Help to stop cravings.  · Help to relieve withdrawal symptoms.  Your health care provider may recommend:  · Nicotine patches, gum, or lozenges.  · Nicotine inhalers or sprays.  · Non-nicotine medicine that is taken by mouth.  Find resources  Find resources and support systems that can help you to quit smoking and remain smoke-free after you quit. These resources are most helpful when you use them often. They include:  · Online chats with a counselor.  · Telephone quitlines.  · Printed self-help materials.  · Support groups or group counseling.  · Text messaging programs.  · Mobile phone apps or applications. Use apps that can help you stick to your quit plan by providing reminders, tips, and encouragement. There are many free apps for mobile devices as well as websites. Examples include Quit Guide from the CDC and smokefree.gov  What things can I do to make it easier to quit?    · Reach out to your family and friends for support and encouragement. Call telephone quitlines (7-445-QUIT-NOW), reach out to support groups, or work with a counselor for support.  · Ask people who smoke to avoid smoking around you.  · Avoid places that trigger you to smoke, such as bars, parties, or smoke-break areas at work.  · Spend time with people who do not smoke.  · Lessen the stress in your life. Stress can be a smoking trigger for some  people. To lessen stress, try:  ? Exercising regularly.  ? Doing deep-breathing exercises.  ? Doing yoga.  ? Meditating.  ? Performing a body scan. This involves closing your eyes, scanning your body from head to toe, and noticing which parts of your body are particularly tense. Try to relax the muscles in those areas.  How will I feel when I quit smoking?  Day 1 to 3 weeks  Within the first 24 hours of quitting smoking, you may start to feel withdrawal symptoms. These symptoms are usually most noticeable 2-3 days after quitting, but they usually do not last for more than 2-3 weeks. You may experience these symptoms:  · Mood swings.  · Restlessness, anxiety, or irritability.  · Trouble concentrating.  · Dizziness.  · Strong cravings for sugary foods and nicotine.  · Mild weight gain.  · Constipation.  · Nausea.  · Coughing or a sore throat.  · Changes in how the medicines that you take for unrelated issues work in your body.  · Depression.  · Trouble sleeping (insomnia).  Week 3 and afterward  After the first 2-3 weeks of quitting, you may start to notice more positive results, such as:  · Improved sense of smell and taste.  · Decreased coughing and sore throat.  · Slower heart rate.  · Lower blood pressure.  · Clearer skin.  · The ability to breathe more easily.  · Fewer sick days.  Quitting smoking can be very challenging. Do not get discouraged if you are not successful the first time. Some people need to make many attempts to quit before they achieve long-term success. Do your best to stick to your quit plan, and talk with your health care provider if you have any questions or concerns.  Summary  · Smoking tobacco is the leading cause of preventable death. Quitting smoking is one of the best things that you can do for your health.  · When you decide to quit smoking, create a plan to help you succeed.  · Quit smoking right away, not slowly over a period of time.  · When you start quitting, seek help from your  health care provider, family, or friends.  This information is not intended to replace advice given to you by your health care provider. Make sure you discuss any questions you have with your health care provider.  Document Released: 12/12/2002 Document Revised: 09/11/2020 Document Reviewed: 03/07/2020  Elsevier Patient Education © 2020 [employer]  Smoking and Musculoskeletal Health  Smoking is bad for your health. Most people know that smoking causes lung disease, heart disease, and cancer. But people may not realize that it also affects their bones, muscles, and joints (musculoskeletal system). When you smoke, the effects on your lungs and heart result in less oxygen for your musculoskeletal system. This can lead to poor bone and joint health.  How can smoking affect my musculoskeletal health?  Smoking can:  · Increase your risk of having weak, thin bones (osteoporosis). Elderly smokers are at higher risk for bone fractures related to osteoporosis.  · Decrease the ability of bone-forming cells to make and replace bone (in addition to reducing oxygen and blood flow).  · Reduce your body's ability to absorb calcium from your diet. Less calcium means weaker bones.  · Interfere with the breakdown of the female hormone estrogen. Smoking lowers estrogen, which is a hormone that helps keep bones strong. Women who smoke may have earlier menopause. Menopause is a risk factor for osteoporosis.  · Weaken the tissues that attach bones to muscles (tendons). This can lead to shoulder, back, and other joint injuries.  · Increase your risk of rheumatoid arthritis or make the condition worse if you already have it.  · Slow down healing and increase your risk of infection and other complications if you have a bone fracture or surgery that involves your musculoskeletal system.  · Make you get out of breath easily. This can keep you from getting the exercise you need to keep your bones and joints healthy.  · Decrease your appetite  and body mass. You may lose weight and muscle strength. This can put you at higher risk for muscle injury, joint injury, and broken bones.  What actions can I take to prevent musculoskeletal problems?  Quit smoking         · Do not start smoking. Quit if you already do. Even stopping later in life can improve musculoskeletal health.  · Do not use any products that contain nicotine or tobacco. Do not replace cigarette smoking with e-cigarettes. The safety of e-cigarettes is not known, and some may contain harmful chemicals.  · Make a plan to quit smoking and commit to it. Look for programs to help you, and ask your health care provider for recommendations and ideas.  · Talk with your health care provider about using nicotine replacement medicines to help you quit, such as gum, lozenges, patches, sprays, or pills.  Make other lifestyle changes    · Eat a healthy diet that includes calcium and vitamin D. These nutrients are important for bone health.  ? Calcium is found in dairy foods and green leafy vegetables.  ? Vitamin D is found in eggs, fish, and liver.  ? Many foods also have vitamin D and calcium added to them (are fortified).  ? Ask your health care provider if you would benefit from taking a supplement.  · Get out in the sunshine for a short time every day. This increases production of vitamin D.  · Get 30 minutes of exercise at least 5 days a week. Weight-bearing and strength exercises are best for musculoskeletal health. Ask your health care provider what type of exercise is safe for you.  · Do not drink alcohol if:  ? Your health care provider tells you not to drink.  ? You are pregnant, may be pregnant, or are planning to become pregnant.  · If you drink alcohol, limit how much you have:  ? 0-1 drink a day for women.  ? 0-2 drinks a day for men.  · Be aware of how much alcohol is in your drink. In the [place] one drink equals one 12 oz bottle of beer (355 mL), one 5 oz glass of wine (148 mL), or one 1½ oz  glass of hard liquor (44 mL).  Where to find more information  You may find more information about smoking, musculoskeletal health, and quitting smoking from:  · American Academy of Orthopaedic Surgeons: orthoinfo.aaos.org  · National Institutes of Health, Osteoporosis and Related Bone Diseases National Resource Center: bones.nih.gov  · HelpGuide.org: helpguide.org  · Smokefree.gov: smokefree.gov  · American Lung Association: lung.org  Contact a health care provider if:  · You need help to quit smoking.  Summary  · When you smoke, the effects on your lungs and heart result in less oxygen for your musculoskeletal system.  · Even stopping smoking later in life can improve musculoskeletal health.  · Do not use any products that contain nicotine or tobacco, such as cigarettes and e-cigarettes.  · If you need help quitting, ask your health care provider.  This information is not intended to replace advice given to you by your health care provider. Make sure you discuss any questions you have with your health care provider.  Document Released: 04/15/2019 Document Revised: 09/11/2020 Document Reviewed: 04/15/2019  [employer] Patient Education © 2020 [employer]  Urinary Incontinence    Urinary incontinence refers to a condition in which a person is unable to control where and when to pass urine. A person with this condition will urinate when he or she does not mean to (involuntarily).  What are the causes?  This condition may be caused by:  · Medicines.  · Infections.  · Constipation.  · Overactive bladder muscles.  · Weak bladder muscles.  · Weak pelvic floor muscles. These muscles provide support for the bladder, intestine, and, in women, the uterus.  · Enlarged prostate in men. The prostate is a gland near the bladder. When it gets too big, it can pinch the urethra. With the urethra blocked, the bladder can weaken and lose the ability to empty properly.  · Surgery.  · Emotional factors, such as anxiety, stress, or  post-traumatic stress disorder (PTSD).  · Pelvic organ prolapse. This happens in women when organs shift out of place and into the vagina. This shift can prevent the bladder and urethra from working properly.  What increases the risk?  The following factors may make you more likely to develop this condition:  · Older age.  · Obesity and physical inactivity.  · Pregnancy and childbirth.  · Menopause.  · Diseases that affect the nerves or spinal cord (neurological diseases).  · Long-term (chronic) coughing. This can increase pressure on the bladder and pelvic floor muscles.  What are the signs or symptoms?  Symptoms may vary depending on the type of urinary incontinence you have. They include:  · A sudden urge to urinate, but passing urine involuntarily before you can get to a bathroom (urge incontinence).  · Suddenly passing urine with any activity that forces urine to pass, such as coughing, laughing, exercise, or sneezing (stress incontinence).  · Needing to urinate often, but urinating only a small amount, or constantly dribbling urine (overflow incontinence).  · Urinating because you cannot get to the bathroom in time due to a physical disability, such as arthritis or injury, or communication and thinking problems, such as Alzheimer disease (functional incontinence).  How is this diagnosed?  This condition may be diagnosed based on:  · Your medical history.  · A physical exam.  · Tests, such as:  ? Urine tests.  ? X-rays of your kidney and bladder.  ? Ultrasound.  ? CT scan.  ? Cystoscopy. In this procedure, a health care provider inserts a tube with a light and camera (cystoscope) through the urethra and into the bladder in order to check for problems.  ? Urodynamic testing. These tests assess how well the bladder, urethra, and sphincter can store and release urine. There are different types of urodynamic tests, and they vary depending on what the test is measuring.  To help diagnose your condition, your health  care provider may recommend that you keep a log of when you urinate and how much you urinate.  How is this treated?  Treatment for this condition depends on the type of incontinence that you have and its cause. Treatment may include:  · Lifestyle changes, such as:  ? Quitting smoking.  ? Maintaining a healthy weight.  ? Staying active. Try to get 150 minutes of moderate-intensity exercise every week. Ask your health care provider which activities are safe for you.  ? Eating a healthy diet.  § Avoid high-fat foods, like fried foods.  § Avoid refined carbohydrates like white bread and white rice.  § Limit how much alcohol and caffeine you drink.  § Increase your fiber intake. Foods such as fresh fruits, vegetables, beans, and whole grains are healthy sources of fiber.  · Pelvic floor muscle exercises.  · Bladder training, such as lengthening the amount of time between bathroom breaks, or using the bathroom at regular intervals.  · Using techniques to suppress bladder urges. This can include distraction techniques or controlled breathing exercises.  · Medicines to relax the bladder muscles and prevent bladder spasms.  · Medicines to help slow or prevent the growth of a man's prostate.  · Botox injections. These can help relax the bladder muscles.  · Using pulses of electricity to help change bladder reflexes (electrical nerve stimulation).  · For women, using a medical device to prevent urine leaks. This is a small, tampon-like, disposable device that is inserted into the urethra.  · Injecting collagen or carbon beads (bulking agents) into the urinary sphincter. These can help thicken tissue and close the bladder opening.  · Surgery.  Follow these instructions at home:  Lifestyle  · Limit alcohol and caffeine. These can fill your bladder quickly and irritate it.  · Keep yourself clean to help prevent odors and skin damage. Ask your doctor about special skin creams and cleansers that can protect the skin from  urine.  · Consider wearing pads or adult diapers. Make sure to change them regularly, and always change them right after experiencing incontinence.  General instructions  · Take over-the-counter and prescription medicines only as told by your health care provider.  · Use the bathroom about every 3-4 hours, even if you do not feel the need to urinate. Try to empty your bladder completely every time. After urinating, wait a minute. Then try to urinate again.  · Make sure you are in a relaxed position while urinating.  · If your incontinence is caused by nerve problems, keep a log of the medicines you take and the times you go to the bathroom.  · Keep all follow-up visits as told by your health care provider. This is important.  Contact a health care provider if:  · You have pain that gets worse.  · Your incontinence gets worse.  Get help right away if:  · You have a fever or chills.  · You are unable to urinate.  · You have redness in your groin area or down your legs.  Summary  · Urinary incontinence refers to a condition in which a person is unable to control where and when to pass urine.  · This condition may be caused by medicines, infection, weak bladder muscles, weak pelvic floor muscles, enlargement of the prostate (in men), or surgery.  · The following factors increase your risk for developing this condition: older age, obesity, pregnancy and childbirth, menopause, neurological diseases, and chronic coughing.  · There are several types of urinary incontinence. They include urge incontinence, stress incontinence, overflow incontinence, and functional incontinence.  · This condition is usually treated first with lifestyle and behavioral changes, such as quitting smoking, eating a healthier diet, and doing regular pelvic floor exercises. Other treatment options include medicines, bulking agents, medical devices, electrical nerve stimulation, or surgery.  This information is not intended to replace advice given to  you by your health care provider. Make sure you discuss any questions you have with your health care provider.  Document Released: 01/25/2006 Document Revised: 12/28/2018 Document Reviewed: 03/29/2018  Elsevier Patient Education © 2020 [employer]  Neurogenic Bladder    Neurogenic bladder is a bladder control disorder. It is usually caused by problems with the nerves that control the bladder. Your brain sends signals through your spinal cord to the muscles in your bladder that start and stop urine flow. If you have neurogenic bladder, the nerves and muscles do not work together the way they should.  This condition may make the bladder overactive, meaning you have trouble holding urine. In other cases, it may make the bladder underactive, meaning you have trouble passing urine.  What are the causes?  This condition may be caused by any kind of nerve damage or condition that disrupts the signals from your brain to your bladder. Many things can cause these nerve problems, including:  · A disease that affects the nervous system, such as:  ? Alzheimer disease.  ? Cerebral palsy.  ? Multiple sclerosis.  ? Diabetes.  ? Parkinson disease.  · Damage to your brain or spinal cord. This can come from:  ? Trauma.  ? Tumors.  ? Infection.  ? Surgery.  ? Alcohol abuse.  ? Stroke.  ? A congenital disability that affects the spinal cord.  What increases the risk?  You are more likely to develop this condition if you have nerve damage or a nerve disorder.  What are the signs or symptoms?  Signs and symptoms of this condition include:  · Leaking or gushing urine (incontinence).  · A sudden, strong urge to pass urine (urgency).  · Frequent urination during the day and night.  · Being unable to empty your bladder completely (urinary retention).  · Frequent urinary tract infections.  How is this diagnosed?  This condition may be diagnosed based on:  · Your symptoms and medical history.  · A physical exam.  · Results of a bladder diary.  You may be asked to keep a record of your bladder symptoms and the times that you urinate.  You may also have tests, such as:  · A urine test to check for infection.  · A bladder scan after you urinate to see how much urine is left in your bladder.  · Tests to measure your urine flow and see how well the flow is controlled (urodynamic tests).  · A procedure that uses a small device with a camera to look through your urethra into your bladder (cystoscopy). A health care provider who specializes in the urinary tract (urologist) may do this test.  · Imaging tests of your brain or spine, such as MRI or CT.  How is this treated?  Treatment for this condition depends on the cause and the symptoms that you have. Work closely with your health care provider to find the treatments that will improve your quality of life. Treatment options include:  · Learning ways to control when you urinate, such as:  ? Urinating at scheduled times.  ? Training yourself to delay urination.  ? Doing exercises to strengthen the muscles that control urine flow (Kegel exercises).  ? Avoiding foods or drinks that make your symptoms worse.  · Taking medicines to:  ? Stimulate an underactive bladder.  ? Relax an overactive bladder.  ? Treat a urinary tract infection.  · Learning how to use a thin tube (catheter) to empty your bladder. A catheter is a hollow tube that you pass through your urethra.  · Procedures to stimulate the nerves that control your bladder.  · Surgery, if other treatments do not help.  Follow these instructions at home:  Lifestyle  · Keep a bladder diary to find out which foods, liquids, or activities make your symptoms worse.  · Use your bladder diary to schedule bathroom trips. If you are away from home, plan to be near a bathroom when your schedule says you will need one.  · Limit your drinking of beverages that stimulate urination. These include soda, coffee, and tea.  · After urinating, wait a few minutes and try again  (double voiding).  · Make sure you urinate just before you leave the house and just before you go to bed.  Kegel exercises  Do Kegel exercises to strengthen the muscles that control the passing of urine. These muscles are the ones you use to try to hold urine when you need to urinate. To do Kegel exercises:  1. Squeeze your pelvic floor muscles tight, as if you are trying to stop the flow of urine. You should feel a tight lift in your rectal area. If you are female, you should also feel a tightness in your vaginal area. Keep your stomach, buttocks, and legs relaxed.  2. Hold the muscles tight for 5-10 seconds.  3. Relax your muscles for the same amount of time.  4. Repeat 10 times.  Repeat this exercise 3 times a day or as many times as told by your health care provider.  General instructions  · Take over-the-counter and prescription medicines only as told by your health care provider.  · Keep all follow-up visits as told by your health care provider. This is important.  Contact a health care provider if:  · You are having a hard time controlling your symptoms.  · Your symptoms are getting worse.  · You have signs of a urinary tract infection. These may include:  ? A burning feeling when you urinate.  ? Chills.  ? Fever.  Get help right away if:  · You cannot pass urine.  Summary  · Neurogenic bladder is a bladder control disorder caused by problems with the nerves that control the bladder. This condition may make the bladder overactive or underactive.  · This condition may be caused by any kind of nerve damage or condition that disrupts the signals from your brain to your bladder.  · Treatment depends on the cause of your neurogenic bladder and the symptoms that you have. Work closely with your health care provider to find the treatments that will improve your quality of life.  This information is not intended to replace advice given to you by your health care provider. Make sure you discuss any questions you have  with your health care provider.  Document Released: 06/30/2008 Document Revised: 12/31/2018 Document Reviewed: 12/31/2018  [employer] Patient Education © 2020 [employer]  Urinary Tract Infection, Adult    A urinary tract infection (UTI) is an infection of any part of the urinary tract. The urinary tract includes the kidneys, ureters, bladder, and urethra. These organs make, store, and get rid of urine in the body.  Your health care provider may use other names to describe the infection. An upper UTI affects the ureters and kidneys (pyelonephritis). A lower UTI affects the bladder (cystitis) and urethra (urethritis).  What are the causes?  Most urinary tract infections are caused by bacteria in your genital area, around the entrance to your urinary tract (urethra). These bacteria grow and cause inflammation of your urinary tract.  What increases the risk?  You are more likely to develop this condition if:  · You have a urinary catheter that stays in place (indwelling).  · You are not able to control when you urinate or have a bowel movement (you have incontinence).  · You are female and you:  ? Use a spermicide or diaphragm for birth control.  ? Have low estrogen levels.  ? Are pregnant.  · You have certain genes that increase your risk (genetics).  · You are sexually active.  · You take antibiotic medicines.  · You have a condition that causes your flow of urine to slow down, such as:  ? An enlarged prostate, if you are male.  ? Blockage in your urethra (stricture).  ? A kidney stone.  ? A nerve condition that affects your bladder control (neurogenic bladder).  ? Not getting enough to drink, or not urinating often.  · You have certain medical conditions, such as:  ? Diabetes.  ? A weak disease-fighting system (immunesystem).  ? Sickle cell disease.  ? Gout.  ? Spinal cord injury.  What are the signs or symptoms?  Symptoms of this condition include:  · Needing to urinate right away (urgently).  · Frequent urination  or passing small amounts of urine frequently.  · Pain or burning with urination.  · Blood in the urine.  · Urine that smells bad or unusual.  · Trouble urinating.  · Cloudy urine.  · Vaginal discharge, if you are female.  · Pain in the abdomen or the lower back.  You may also have:  · Vomiting or a decreased appetite.  · Confusion.  · Irritability or tiredness.  · A fever.  · Diarrhea.  The first symptom in older adults may be confusion. In some cases, they may not have any symptoms until the infection has worsened.  How is this diagnosed?  This condition is diagnosed based on your medical history and a physical exam. You may also have other tests, including:  · Urine tests.  · Blood tests.  · Tests for sexually transmitted infections (STIs).  If you have had more than one UTI, a cystoscopy or imaging studies may be done to determine the cause of the infections.  How is this treated?  Treatment for this condition includes:  · Antibiotic medicine.  · Over-the-counter medicines to treat discomfort.  · Drinking enough water to stay hydrated.  If you have frequent infections or have other conditions such as a kidney stone, you may need to see a health care provider who specializes in the urinary tract (urologist).  In rare cases, urinary tract infections can cause sepsis. Sepsis is a life-threatening condition that occurs when the body responds to an infection. Sepsis is treated in the hospital with IV antibiotics, fluids, and other medicines.  Follow these instructions at home:    Medicines  · Take over-the-counter and prescription medicines only as told by your health care provider.  · If you were prescribed an antibiotic medicine, take it as told by your health care provider. Do not stop using the antibiotic even if you start to feel better.  General instructions  · Make sure you:  ? Empty your bladder often and completely. Do not hold urine for long periods of time.  ? Empty your bladder after sex.  ? Wipe from  front to back after a bowel movement if you are female. Use each tissue one time when you wipe.  · Drink enough fluid to keep your urine pale yellow.  · Keep all follow-up visits as told by your health care provider. This is important.  Contact a health care provider if:  · Your symptoms do not get better after 1-2 days.  · Your symptoms go away and then return.  Get help right away if you have:  · Severe pain in your back or your lower abdomen.  · A fever.  · Nausea or vomiting.  Summary  · A urinary tract infection (UTI) is an infection of any part of the urinary tract, which includes the kidneys, ureters, bladder, and urethra.  · Most urinary tract infections are caused by bacteria in your genital area, around the entrance to your urinary tract (urethra).  · Treatment for this condition often includes antibiotic medicines.  · If you were prescribed an antibiotic medicine, take it as told by your health care provider. Do not stop using the antibiotic even if you start to feel better.  · Keep all follow-up visits as told by your health care provider. This is important.  This information is not intended to replace advice given to you by your health care provider. Make sure you discuss any questions you have with your health care provider.  Document Released: 09/27/2006 Document Revised: 12/05/2019 Document Reviewed: 06/27/2019  [employer] Patient Education © 2020 [employer]

## 2020-11-16 DIAGNOSIS — M79.2 NEUROPATHIC PAIN: ICD-10-CM

## 2020-11-16 DIAGNOSIS — G36.0 NEUROMYELITIS OPTICA (HCC): ICD-10-CM

## 2020-11-16 RX ORDER — GABAPENTIN 600 MG/1
TABLET ORAL
Qty: 180 TABLET | Refills: 0 | Status: SHIPPED | OUTPATIENT
Start: 2020-11-16 | End: 2020-12-23

## 2020-11-16 RX ORDER — DIAZEPAM 5 MG/1
TABLET ORAL
Qty: 60 TABLET | Refills: 0 | Status: SHIPPED | OUTPATIENT
Start: 2020-11-16 | End: 2021-01-01

## 2020-11-18 ENCOUNTER — HOSPITAL ENCOUNTER (OUTPATIENT)
Dept: CT IMAGING | Facility: HOSPITAL | Age: 60
Discharge: HOME | End: 2020-11-18
Admitting: NURSE PRACTITIONER

## 2020-11-18 DIAGNOSIS — R35.0 FREQUENCY OF URINATION: ICD-10-CM

## 2020-11-18 DIAGNOSIS — R31.9 URINARY TRACT INFECTION WITH HEMATURIA, SITE UNSPECIFIED: ICD-10-CM

## 2020-11-18 DIAGNOSIS — N39.0 URINARY TRACT INFECTION WITH HEMATURIA, SITE UNSPECIFIED: ICD-10-CM

## 2020-11-18 PROCEDURE — 74178 CT ABD&PLV WO CNTR FLWD CNTR: CPT | Performed by: RADIOLOGY

## 2020-11-18 PROCEDURE — 25010000002 IOPAMIDOL 61 % SOLUTION: Performed by: NURSE PRACTITIONER

## 2020-11-18 PROCEDURE — 74178 CT ABD&PLV WO CNTR FLWD CNTR: CPT

## 2020-11-18 RX ADMIN — IOPAMIDOL 80 ML: 612 INJECTION, SOLUTION INTRAVENOUS at 13:41

## 2020-11-19 ENCOUNTER — OFFICE VISIT (OUTPATIENT)
Dept: UROLOGY | Facility: CLINIC | Age: 60
End: 2020-11-19

## 2020-11-19 VITALS — BODY MASS INDEX: 32.3 KG/M2 | TEMPERATURE: 97.9 F | WEIGHT: 201 LBS | HEIGHT: 66 IN

## 2020-11-19 DIAGNOSIS — Z48.816 AFTERCARE FOLLOWING SURGERY OF THE GENITOURINARY SYSTEM: ICD-10-CM

## 2020-11-19 DIAGNOSIS — R35.0 FREQUENCY OF URINATION: Primary | ICD-10-CM

## 2020-11-19 DIAGNOSIS — N39.0 RECURRENT UTI: ICD-10-CM

## 2020-11-19 DIAGNOSIS — D49.4 BLADDER TUMOR: ICD-10-CM

## 2020-11-19 LAB
BILIRUB BLD-MCNC: NEGATIVE MG/DL
CLARITY, POC: ABNORMAL
COLOR UR: YELLOW
GLUCOSE UR STRIP-MCNC: NEGATIVE MG/DL
KETONES UR QL: NEGATIVE
LEUKOCYTE EST, POC: ABNORMAL
NITRITE UR-MCNC: POSITIVE MG/ML
PH UR: 6.5 [PH] (ref 5–8)
PROT UR STRIP-MCNC: NEGATIVE MG/DL
RBC # UR STRIP: ABNORMAL /UL
SP GR UR: 1.01 (ref 1–1.03)
UROBILINOGEN UR QL: NORMAL

## 2020-11-19 PROCEDURE — 96372 THER/PROPH/DIAG INJ SC/IM: CPT | Performed by: NURSE PRACTITIONER

## 2020-11-19 PROCEDURE — 81003 URINALYSIS AUTO W/O SCOPE: CPT | Performed by: NURSE PRACTITIONER

## 2020-11-19 PROCEDURE — 87086 URINE CULTURE/COLONY COUNT: CPT | Performed by: NURSE PRACTITIONER

## 2020-11-19 PROCEDURE — 99213 OFFICE O/P EST LOW 20 MIN: CPT | Performed by: NURSE PRACTITIONER

## 2020-11-19 PROCEDURE — 52000 CYSTOURETHROSCOPY: CPT | Performed by: NURSE PRACTITIONER

## 2020-11-19 RX ORDER — GENTAMICIN SULFATE 40 MG/ML
80 INJECTION, SOLUTION INTRAMUSCULAR; INTRAVENOUS ONCE
Status: COMPLETED | OUTPATIENT
Start: 2020-11-19 | End: 2020-11-19

## 2020-11-19 RX ORDER — GENTAMICIN SULFATE 80 MG/100ML
80 INJECTION, SOLUTION INTRAVENOUS ONCE
Status: CANCELLED | OUTPATIENT
Start: 2020-12-07 | End: 2020-11-19

## 2020-11-19 RX ORDER — MIRABEGRON 25 MG/1
1 TABLET, FILM COATED, EXTENDED RELEASE ORAL DAILY
COMMUNITY
Start: 2020-09-22 | End: 2020-12-04

## 2020-11-19 RX ORDER — CIPROFLOXACIN 250 MG/1
250 TABLET, FILM COATED ORAL 2 TIMES DAILY
Qty: 20 TABLET | Refills: 0 | Status: SHIPPED | OUTPATIENT
Start: 2020-11-19 | End: 2020-12-04

## 2020-11-19 RX ADMIN — GENTAMICIN SULFATE 80 MG: 40 INJECTION, SOLUTION INTRAMUSCULAR; INTRAVENOUS at 12:01

## 2020-11-20 LAB — BACTERIA SPEC AEROBE CULT: NO GROWTH

## 2020-11-23 DIAGNOSIS — D49.4 BLADDER TUMOR: Primary | ICD-10-CM

## 2020-12-04 ENCOUNTER — APPOINTMENT (OUTPATIENT)
Dept: PREADMISSION TESTING | Facility: HOSPITAL | Age: 60
End: 2020-12-04

## 2020-12-04 ENCOUNTER — LAB (OUTPATIENT)
Dept: LAB | Facility: HOSPITAL | Age: 60
End: 2020-12-04

## 2020-12-04 DIAGNOSIS — D49.4 BLADDER TUMOR: ICD-10-CM

## 2020-12-04 LAB
ANION GAP SERPL CALCULATED.3IONS-SCNC: 11.2 MMOL/L (ref 5–15)
BUN SERPL-MCNC: 14 MG/DL (ref 6–20)
BUN/CREAT SERPL: 23 (ref 7–25)
CALCIUM SPEC-SCNC: 9.7 MG/DL (ref 8.6–10.5)
CHLORIDE SERPL-SCNC: 99 MMOL/L (ref 98–107)
CO2 SERPL-SCNC: 26.8 MMOL/L (ref 22–29)
CREAT SERPL-MCNC: 0.61 MG/DL (ref 0.57–1)
DEPRECATED RDW RBC AUTO: 51.6 FL (ref 37–54)
ERYTHROCYTE [DISTWIDTH] IN BLOOD BY AUTOMATED COUNT: 15.8 % (ref 12.3–15.4)
GFR SERPL CREATININE-BSD FRML MDRD: 100 ML/MIN/1.73
GLUCOSE SERPL-MCNC: 140 MG/DL (ref 65–99)
HCT VFR BLD AUTO: 42.5 % (ref 34–46.6)
HGB BLD-MCNC: 12.8 G/DL (ref 12–15.9)
MCH RBC QN AUTO: 26.7 PG (ref 26.6–33)
MCHC RBC AUTO-ENTMCNC: 30.1 G/DL (ref 31.5–35.7)
MCV RBC AUTO: 88.7 FL (ref 79–97)
PLATELET # BLD AUTO: 324 10*3/MM3 (ref 140–450)
PMV BLD AUTO: 10.9 FL (ref 6–12)
POTASSIUM SERPL-SCNC: 3.8 MMOL/L (ref 3.5–5.2)
RBC # BLD AUTO: 4.79 10*6/MM3 (ref 3.77–5.28)
SODIUM SERPL-SCNC: 137 MMOL/L (ref 136–145)
WBC # BLD AUTO: 7.78 10*3/MM3 (ref 3.4–10.8)

## 2020-12-04 PROCEDURE — C9803 HOPD COVID-19 SPEC COLLECT: HCPCS

## 2020-12-04 PROCEDURE — 85027 COMPLETE CBC AUTOMATED: CPT

## 2020-12-04 PROCEDURE — 36415 COLL VENOUS BLD VENIPUNCTURE: CPT

## 2020-12-04 PROCEDURE — 80048 BASIC METABOLIC PNL TOTAL CA: CPT

## 2020-12-04 PROCEDURE — U0004 COV-19 TEST NON-CDC HGH THRU: HCPCS | Performed by: UROLOGY

## 2020-12-04 NOTE — DISCHARGE INSTRUCTIONS
12/07/20  0730  ARRIVAL TIME    TAKE the following medications the morning of surgery:    All heart or blood pressure medications    HOLD all diabetic medications the morning of surgery as ordered by physician.    Please discontinue all blood thinners and anticoagulants (except aspirin) prior to surgery as per your surgeon and cardiologist instructions.  Aspirin may be continued up to the day prior to surgery.     CHLORHEXIDINE CLOTHS GIVEN WITH INSTRUCTIONS AND FORM TO RETURN TO HOSPITAL, IF APPLICABLE.    General Instructions:  · Do not eat or drink after midnight:12/6/20  includes water, mints, or gum. You may brush your teeth.  Dental appliances that are removable must be taken out day of surgery.  · Do not smoke, chew tobacco, or drink alcohol.  · Bring medications in original bottles, any inhalers and if applicable your C-PAP/BI-PAP machine.  · Bring any papers given to you in the doctor's office.  · Wear clean comfortable clothes and socks.  · Do not wear contact lenses or make-up. Bring a case for your glasses if applicable.  · Bring crutches or walker if applicable.  · Leave all other valuables and jewelry at home.    If you were given a blood bank ID arm band remember to bring it with you the day of surgery.    Preventing a Surgical Site Infection:  Shower the night before surgery (unless instructed other wise) using a fresh bar of anti-bacterial soap (such as Dial) and clean washcloth. Dry with a clean towel and dress in clean clothing.  For 2 to 3 days before surgery, avoid shaving with a razor near where you will have surgery because the razor can irritate skin and make it easier to develop an infection. Ask your surgeon if you will be receiving antibiotics prior to surgery.  Make sure you, your family, and all healthcare providers clear their hands with soap and water or an alcohol-based hand  before caring for you or your wound.  If at all possible, quit smoking as many days before surgery as  you can.    Day of surgery:  Upon arrival, a Pre-op nurse and Anesthesiologist will review your health history, obtain vital signs, and answer questions you may have. The only belongings needed at this time will be your home medications and if applicable your C-PAP/BI-PAP machine. If you are staying overnight your family can leave the rest of your belongings in the car and bring them to your room later. A Pre-op nurse will start an IV and you may receive medication in preparation for surgery, including something to help you relax. Your family will be able to see you in the Pre-op area. While you are in surgery your family should notify the waiting room  if they leave the waiting room area and provide a contact phone number.    Please be aware that surgery does come with discomfort. We want to make every effort to control your discomfort so please discuss any uncontrolled symptoms with your nurse. Your doctor will most likely have prescribed pain medications.    If you are going home after surgery you will receive individualized written care instructions before being discharged. A responsible adult must drive you to and from the hospital on the day of surgery and stay with you for 24 hours.    If you are staying overnight following surgery, you will be transported to your hospital room following the recovery period.  [facility] has all private rooms.    If you have any questions please call Pre-Admission Testing at [phone].  Deductibles and co-payments are collected on the day of service. Please be prepared to pay the required co-pay, deductible or deposit on the day of service as defined by your plan.    A RESPONSIBLE PERSON MUST REMAIN IN THE WAITING ROOM DURING YOUR PROCEDURE AND A RESPONSIBLE  MUST BE AVAILABLE UPON YOUR DISCHARGE.

## 2020-12-05 LAB — SARS-COV-2 RNA RESP QL NAA+PROBE: NOT DETECTED

## 2020-12-07 ENCOUNTER — HOSPITAL ENCOUNTER (OUTPATIENT)
Facility: HOSPITAL | Age: 60
Discharge: HOME | End: 2020-12-07
Attending: UROLOGY | Admitting: UROLOGY

## 2020-12-07 ENCOUNTER — ANESTHESIA (OUTPATIENT)
Dept: PERIOP | Facility: HOSPITAL | Age: 60
End: 2020-12-07

## 2020-12-07 ENCOUNTER — ANESTHESIA EVENT (OUTPATIENT)
Dept: PERIOP | Facility: HOSPITAL | Age: 60
End: 2020-12-07

## 2020-12-07 ENCOUNTER — APPOINTMENT (OUTPATIENT)
Dept: GENERAL RADIOLOGY | Facility: HOSPITAL | Age: 60
End: 2020-12-07

## 2020-12-07 VITALS
HEIGHT: 66 IN | SYSTOLIC BLOOD PRESSURE: 132 MMHG | RESPIRATION RATE: 19 BRPM | TEMPERATURE: 97.6 F | DIASTOLIC BLOOD PRESSURE: 74 MMHG | HEART RATE: 90 BPM | BODY MASS INDEX: 31.1 KG/M2 | OXYGEN SATURATION: 96 % | WEIGHT: 193.5 LBS

## 2020-12-07 DIAGNOSIS — D49.4 BLADDER TUMOR: ICD-10-CM

## 2020-12-07 PROCEDURE — 25010000003 MEPERIDINE PER 100 MG: Performed by: NURSE ANESTHETIST, CERTIFIED REGISTERED

## 2020-12-07 PROCEDURE — 25010000002 FENTANYL CITRATE (PF) 100 MCG/2ML SOLUTION: Performed by: NURSE ANESTHETIST, CERTIFIED REGISTERED

## 2020-12-07 PROCEDURE — 25010000002 GENTAMICIN PER 80 MG: Performed by: UROLOGY

## 2020-12-07 PROCEDURE — 88307 TISSUE EXAM BY PATHOLOGIST: CPT | Performed by: UROLOGY

## 2020-12-07 PROCEDURE — 25010000002 MIDAZOLAM PER 1 MG: Performed by: NURSE ANESTHETIST, CERTIFIED REGISTERED

## 2020-12-07 PROCEDURE — 25010000002 ONDANSETRON PER 1 MG: Performed by: NURSE ANESTHETIST, CERTIFIED REGISTERED

## 2020-12-07 PROCEDURE — 88341 IMHCHEM/IMCYTCHM EA ADD ANTB: CPT | Performed by: UROLOGY

## 2020-12-07 PROCEDURE — 25010000002 KETOROLAC TROMETHAMINE PER 15 MG: Performed by: NURSE ANESTHETIST, CERTIFIED REGISTERED

## 2020-12-07 PROCEDURE — 25010000002 PROPOFOL 10 MG/ML EMULSION: Performed by: NURSE ANESTHETIST, CERTIFIED REGISTERED

## 2020-12-07 PROCEDURE — 88342 IMHCHEM/IMCYTCHM 1ST ANTB: CPT | Performed by: UROLOGY

## 2020-12-07 PROCEDURE — 52204 CYSTOSCOPY W/BIOPSY(S): CPT | Performed by: UROLOGY

## 2020-12-07 RX ORDER — DOCUSATE SODIUM 100 MG/1
100 CAPSULE, LIQUID FILLED ORAL DAILY
COMMUNITY
End: 2021-01-01

## 2020-12-07 RX ORDER — HYDROCODONE BITARTRATE AND ACETAMINOPHEN 10; 325 MG/1; MG/1
1 TABLET ORAL EVERY 4 HOURS PRN
Qty: 12 TABLET | Refills: 0 | Status: SHIPPED | OUTPATIENT
Start: 2020-12-07 | End: 2020-12-31 | Stop reason: SDUPTHER

## 2020-12-07 RX ORDER — PROPOFOL 10 MG/ML
VIAL (ML) INTRAVENOUS AS NEEDED
Status: DISCONTINUED | OUTPATIENT
Start: 2020-12-07 | End: 2020-12-07 | Stop reason: SURG

## 2020-12-07 RX ORDER — GENTAMICIN SULFATE 80 MG/100ML
80 INJECTION, SOLUTION INTRAVENOUS ONCE
Status: COMPLETED | OUTPATIENT
Start: 2020-12-07 | End: 2020-12-07

## 2020-12-07 RX ORDER — FAMOTIDINE 10 MG/ML
INJECTION, SOLUTION INTRAVENOUS AS NEEDED
Status: DISCONTINUED | OUTPATIENT
Start: 2020-12-07 | End: 2020-12-07 | Stop reason: SURG

## 2020-12-07 RX ORDER — MIDAZOLAM HYDROCHLORIDE 1 MG/ML
1 INJECTION INTRAMUSCULAR; INTRAVENOUS
Status: DISCONTINUED | OUTPATIENT
Start: 2020-12-07 | End: 2020-12-07 | Stop reason: HOSPADM

## 2020-12-07 RX ORDER — ONDANSETRON 2 MG/ML
INJECTION INTRAMUSCULAR; INTRAVENOUS AS NEEDED
Status: DISCONTINUED | OUTPATIENT
Start: 2020-12-07 | End: 2020-12-07 | Stop reason: SURG

## 2020-12-07 RX ORDER — MIDAZOLAM HYDROCHLORIDE 1 MG/ML
INJECTION INTRAMUSCULAR; INTRAVENOUS AS NEEDED
Status: DISCONTINUED | OUTPATIENT
Start: 2020-12-07 | End: 2020-12-07 | Stop reason: SURG

## 2020-12-07 RX ORDER — SODIUM CHLORIDE 0.9 % (FLUSH) 0.9 %
10 SYRINGE (ML) INJECTION AS NEEDED
Status: DISCONTINUED | OUTPATIENT
Start: 2020-12-07 | End: 2020-12-07 | Stop reason: HOSPADM

## 2020-12-07 RX ORDER — DROPERIDOL 2.5 MG/ML
0.62 INJECTION, SOLUTION INTRAMUSCULAR; INTRAVENOUS ONCE AS NEEDED
Status: DISCONTINUED | OUTPATIENT
Start: 2020-12-07 | End: 2020-12-07 | Stop reason: HOSPADM

## 2020-12-07 RX ORDER — KETOROLAC TROMETHAMINE 30 MG/ML
INJECTION, SOLUTION INTRAMUSCULAR; INTRAVENOUS AS NEEDED
Status: DISCONTINUED | OUTPATIENT
Start: 2020-12-07 | End: 2020-12-07 | Stop reason: SURG

## 2020-12-07 RX ORDER — LIDOCAINE HYDROCHLORIDE 20 MG/ML
INJECTION, SOLUTION INFILTRATION; PERINEURAL AS NEEDED
Status: DISCONTINUED | OUTPATIENT
Start: 2020-12-07 | End: 2020-12-07 | Stop reason: SURG

## 2020-12-07 RX ORDER — SODIUM CHLORIDE, SODIUM LACTATE, POTASSIUM CHLORIDE, CALCIUM CHLORIDE 600; 310; 30; 20 MG/100ML; MG/100ML; MG/100ML; MG/100ML
100 INJECTION, SOLUTION INTRAVENOUS ONCE AS NEEDED
Status: DISCONTINUED | OUTPATIENT
Start: 2020-12-07 | End: 2020-12-07 | Stop reason: HOSPADM

## 2020-12-07 RX ORDER — SODIUM CHLORIDE 0.9 % (FLUSH) 0.9 %
10 SYRINGE (ML) INJECTION EVERY 12 HOURS SCHEDULED
Status: DISCONTINUED | OUTPATIENT
Start: 2020-12-07 | End: 2020-12-07 | Stop reason: HOSPADM

## 2020-12-07 RX ORDER — MAGNESIUM HYDROXIDE 1200 MG/15ML
LIQUID ORAL AS NEEDED
Status: DISCONTINUED | OUTPATIENT
Start: 2020-12-07 | End: 2020-12-07 | Stop reason: HOSPADM

## 2020-12-07 RX ORDER — IPRATROPIUM BROMIDE AND ALBUTEROL SULFATE 2.5; .5 MG/3ML; MG/3ML
3 SOLUTION RESPIRATORY (INHALATION) ONCE AS NEEDED
Status: DISCONTINUED | OUTPATIENT
Start: 2020-12-07 | End: 2020-12-07 | Stop reason: HOSPADM

## 2020-12-07 RX ORDER — FENTANYL CITRATE 50 UG/ML
50 INJECTION, SOLUTION INTRAMUSCULAR; INTRAVENOUS
Status: DISCONTINUED | OUTPATIENT
Start: 2020-12-07 | End: 2020-12-07 | Stop reason: HOSPADM

## 2020-12-07 RX ORDER — MEPERIDINE HYDROCHLORIDE 25 MG/ML
12.5 INJECTION INTRAMUSCULAR; INTRAVENOUS; SUBCUTANEOUS
Status: DISCONTINUED | OUTPATIENT
Start: 2020-12-07 | End: 2020-12-07 | Stop reason: HOSPADM

## 2020-12-07 RX ORDER — SODIUM CHLORIDE, SODIUM LACTATE, POTASSIUM CHLORIDE, CALCIUM CHLORIDE 600; 310; 30; 20 MG/100ML; MG/100ML; MG/100ML; MG/100ML
INJECTION, SOLUTION INTRAVENOUS CONTINUOUS PRN
Status: DISCONTINUED | OUTPATIENT
Start: 2020-12-07 | End: 2020-12-07 | Stop reason: SURG

## 2020-12-07 RX ORDER — SODIUM CHLORIDE, SODIUM LACTATE, POTASSIUM CHLORIDE, CALCIUM CHLORIDE 600; 310; 30; 20 MG/100ML; MG/100ML; MG/100ML; MG/100ML
125 INJECTION, SOLUTION INTRAVENOUS ONCE
Status: COMPLETED | OUTPATIENT
Start: 2020-12-07 | End: 2020-12-07

## 2020-12-07 RX ORDER — FENTANYL CITRATE 50 UG/ML
INJECTION, SOLUTION INTRAMUSCULAR; INTRAVENOUS AS NEEDED
Status: DISCONTINUED | OUTPATIENT
Start: 2020-12-07 | End: 2020-12-07 | Stop reason: SURG

## 2020-12-07 RX ORDER — ONDANSETRON 2 MG/ML
4 INJECTION INTRAMUSCULAR; INTRAVENOUS AS NEEDED
Status: DISCONTINUED | OUTPATIENT
Start: 2020-12-07 | End: 2020-12-07 | Stop reason: HOSPADM

## 2020-12-07 RX ADMIN — LIDOCAINE HYDROCHLORIDE 20 MG: 20 INJECTION, SOLUTION INFILTRATION; PERINEURAL at 08:38

## 2020-12-07 RX ADMIN — SODIUM CHLORIDE, POTASSIUM CHLORIDE, SODIUM LACTATE AND CALCIUM CHLORIDE: 600; 310; 30; 20 INJECTION, SOLUTION INTRAVENOUS at 08:32

## 2020-12-07 RX ADMIN — EPHEDRINE SULFATE 5 MG: 50 INJECTION, SOLUTION INTRAVENOUS at 08:48

## 2020-12-07 RX ADMIN — GENTAMICIN SULFATE 80 MG: 80 INJECTION, SOLUTION INTRAVENOUS at 08:32

## 2020-12-07 RX ADMIN — FENTANYL CITRATE 50 MCG: 50 INJECTION INTRAMUSCULAR; INTRAVENOUS at 08:32

## 2020-12-07 RX ADMIN — MEPERIDINE HYDROCHLORIDE 12.5 MG: 25 INJECTION INTRAMUSCULAR; INTRAVENOUS; SUBCUTANEOUS at 09:27

## 2020-12-07 RX ADMIN — MIDAZOLAM HYDROCHLORIDE 2 MG: 1 INJECTION, SOLUTION INTRAMUSCULAR; INTRAVENOUS at 08:32

## 2020-12-07 RX ADMIN — PROPOFOL 140 MG: 10 INJECTION, EMULSION INTRAVENOUS at 08:38

## 2020-12-07 RX ADMIN — FAMOTIDINE 20 MG: 10 INJECTION INTRAVENOUS at 08:32

## 2020-12-07 RX ADMIN — EPHEDRINE SULFATE 5 MG: 50 INJECTION, SOLUTION INTRAVENOUS at 08:51

## 2020-12-07 RX ADMIN — FENTANYL CITRATE 50 MCG: 50 INJECTION INTRAMUSCULAR; INTRAVENOUS at 08:56

## 2020-12-07 RX ADMIN — ONDANSETRON 4 MG: 2 INJECTION INTRAMUSCULAR; INTRAVENOUS at 08:32

## 2020-12-07 RX ADMIN — KETOROLAC TROMETHAMINE 30 MG: 30 INJECTION, SOLUTION INTRAMUSCULAR; INTRAVENOUS at 08:40

## 2020-12-07 RX ADMIN — SODIUM CHLORIDE, POTASSIUM CHLORIDE, SODIUM LACTATE AND CALCIUM CHLORIDE 125 ML/HR: 600; 310; 30; 20 INJECTION, SOLUTION INTRAVENOUS at 08:15

## 2020-12-07 NOTE — ANESTHESIA POSTPROCEDURE EVALUATION
Patient: Mary Alvares    Procedure Summary     Date: 12/07/20 Room / Location: [facility] OR 06 /  [facility] OR    Anesthesia Start: 0832 Anesthesia Stop: 0900    Procedure: CYSTOSCOPY TRANSURETHRAL RESECTION OF BLADDER TUMOR (N/A ) Diagnosis:       Bladder tumor      (Bladder tumor [D49.4])    Surgeon: Roldan Bunch MD Provider: Dennis Zheng MD    Anesthesia Type: Not recorded ASA Status: 3          Anesthesia Type: No value filed.    Vitals  Vitals Value Taken Time   /72 12/07/20 0931   Temp 97.3 °F (36.3 °C) 12/07/20 0901   Pulse 93 12/07/20 0931   Resp 21 12/07/20 0931   SpO2 96 % 12/07/20 0931           Post Anesthesia Care and Evaluation    Patient location during evaluation: PHASE II  Patient participation: complete - patient participated  Level of consciousness: awake and alert  Pain score: 0  Pain management: adequate  Airway patency: patent  Anesthetic complications: No anesthetic complications    Cardiovascular status: acceptable  Respiratory status: acceptable  Hydration status: acceptable

## 2020-12-07 NOTE — ANESTHESIA PREPROCEDURE EVALUATION
Anesthesia Evaluation     Patient summary reviewed and Nursing notes reviewed   no history of anesthetic complications:  NPO Solid Status: > 8 hours  NPO Liquid Status: > 8 hours           Airway   Mallampati: II  TM distance: >3 FB  Dental - normal exam     Pulmonary - normal exam   (+) COPD,   Cardiovascular   Exercise tolerance: poor (<4 METS)    Rhythm: regular  Rate: normal    (+) hyperlipidemia,  carotid artery disease      Neuro/Psych    ROS Comment: Peripheral neuropathy  GI/Hepatic/Renal/Endo      Musculoskeletal     Abdominal  - normal exam   Substance History      OB/GYN          Other   arthritis,    history of cancer                    Anesthesia Plan    ASA 3       total IV anesthesia  intravenous induction     Anesthetic plan, all risks, benefits, and alternatives have been provided, discussed and informed consent has been obtained with: patient.  Use of blood products discussed with patient  Consented to blood products.   Plan discussed with CRNA.

## 2020-12-07 NOTE — OP NOTE
CYSTOSCOPY TRANSURETHRAL RESECTION OF BLADDER TUMOR  Procedure Note    Mary Alvares  12/7/2020    Pre-op Diagnosis:   Bladder tumor [D49.4]    Post-op Diagnosis:     Post-Op Diagnosis Codes:     * Bladder tumor [D49.4]    Procedure/CPT® Codes:  60-year-old white female who developed stage IIb cervical cancer around 2005 had a radical hysterectomy, external beam radiation treatments, chemotherapy, and interstitial treatments.  She continues to smoke heavily.  Presented with hematuria upper tract study being negative lower tract investigation showing significant thickening of the bladder wall.  Cystoscopy in the office was noncontributory insofar it showed what appeared to be bullous lesions consistent with carcinoma an area of ulceration on the left upper wall she now presents for formal TURBT following an extensive informed consent she has been off her blood thinner prepped and draped in a sterile fashion she has a known allergy to morphine so therefore a belladonna and opium suppository was not utilized the urethra was gently anesthetized with 22 cc of 2% viscous Xylocaine jelly I anchored a 24 Mongolian resectoscope with bipolar photograph the area extensively.  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  I discussed this with the family at length.  I chose to leave a catheter just for the postop period but take it up before she goes home I will plan to see her back in a week on December 15    Procedure(s):  CYSTOSCOPY TRANSURETHRAL RESECTION OF BLADDER TUMOR-small    Surgeon(s):  Roldan Bunch MD    Anesthesia: see anesthesia record    Staff:   Circulator: Suzette Paz RN  Scrub Person: Maria A Masterson; Latonya Love    Estimated Blood Loss: none  Urine Voided:  * No values recorded between 12/7/2020  8:33 AM and 12/7/2020  9:00 AM *    Specimens:                ID Type Source Tests Collected by Time   A (Not marked as sent) : Tumor Tissue Urinary Bladder TISSUE PATHOLOGY EXAM Roldan Bunch MD 12/7/2020 0854         Drains: None    Findings: Radiation cystitis versus neoplasm    Blood: N/A    Complications: None    Grafts and Implants: None    Roldan Bunch MD     Date: 12/7/2020  Time: 09:05 EST

## 2020-12-07 NOTE — ANESTHESIA PROCEDURE NOTES
Airway  Urgency: elective    Date/Time: 12/7/2020 8:38 AM  Airway not difficult    General Information and Staff    Patient location during procedure: OR  CRNA: Anca Garcia CRNA    Indications and Patient Condition  Indications for airway management: airway protection    Preoxygenated: yes  MILS maintained throughout  Mask difficulty assessment: 0 - not attempted    Final Airway Details  Final airway type: supraglottic airway      Successful airway: unique  Size 4    Number of attempts at approach: 1  Assessment: lips, teeth, and gum same as pre-op

## 2020-12-15 ENCOUNTER — OFFICE VISIT (OUTPATIENT)
Dept: UROLOGY | Facility: CLINIC | Age: 60
End: 2020-12-15

## 2020-12-15 VITALS — WEIGHT: 200 LBS | TEMPERATURE: 97.9 F | HEIGHT: 66 IN | BODY MASS INDEX: 32.14 KG/M2

## 2020-12-15 DIAGNOSIS — D49.4 BLADDER TUMOR: Primary | ICD-10-CM

## 2020-12-15 PROCEDURE — 99213 OFFICE O/P EST LOW 20 MIN: CPT | Performed by: UROLOGY

## 2020-12-15 RX ORDER — NITROFURANTOIN 25; 75 MG/1; MG/1
1 CAPSULE ORAL EVERY 12 HOURS
COMMUNITY
Start: 2020-10-27 | End: 2020-12-28 | Stop reason: SDUPTHER

## 2020-12-15 RX ORDER — PHENAZOPYRIDINE HYDROCHLORIDE 200 MG/1
200 TABLET, FILM COATED ORAL 3 TIMES DAILY PRN
Qty: 60 TABLET | Refills: 3 | Status: SHIPPED | OUTPATIENT
Start: 2020-12-15 | End: 2021-01-01

## 2020-12-15 NOTE — PROGRESS NOTES
Chief Complaint:          Chief Complaint   Patient presents with   • Post-op Follow-up     Tumor Resection       HPI:   60 y.o. female status post a TURBT.  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  She is having no symptoms other than severe burning which is consistent with the disease.  Discussed observations versus cystectomy.  I am awaiting the final path report I gave her Pyridium for pain      Past Medical History:        Past Medical History:   Diagnosis Date   • Acute cystitis    • Arthritis    • Blind right eye    • Cancer (CMS/HCC)     cervical   • Devic's disease (CMS/HCC)    • Elevated cholesterol    • GERD (gastroesophageal reflux disease)    • Hyperlipidemia    • Muscle spasms of lower extremity    • Neurogenic dysfunction of the urinary bladder    • Neuromyelitis optica (CMS/HCC)    • Neuropathic pain    • Self-catheterizes urinary bladder    • Tachycardia    • Vitamin D deficiency          Current Meds:     Current Outpatient Medications   Medication Sig Dispense Refill   • Mirabegron ER (Myrbetriq) 25 MG tablet sustained-release 24 hour 24 hr tablet Take  by mouth.     • nitrofurantoin, macrocrystal-monohydrate, (Macrobid) 100 MG capsule Take 1 capsule by mouth Every 12 (Twelve) Hours.     • azaTHIOprine (IMURAN) 50 MG tablet TAKE 1 TABLET BY MOUTH FOUR TIMES DAILY 120 tablet 5   • baclofen (LIORESAL) 20 MG tablet TAKE 1 TABLET BY MOUTH FOUR TIMES DAILY 120 tablet 5   • clopidogrel (PLAVIX) 75 MG tablet Take 1 tablet by mouth Daily. 30 tablet 11   • diazePAM (VALIUM) 5 MG tablet TAKE 1/2 TABLET BY MOUTH EVERY MORNING AND 1 1/2 TABLETS BY MOUTH EVERY NIGHT AT BEDTIME 60 tablet 0   • docusate sodium (COLACE) 100 MG capsule Take 100 mg by mouth Daily.     • gabapentin (NEURONTIN) 600 MG tablet TAKE 2 TABLETS BY MOUTH THREE TIMES DAILY 180 tablet 0   • HYDROcodone-acetaminophen (NORCO)  MG per tablet Take 1  tablet by mouth Every 4 (Four) Hours As Needed for Moderate Pain  (Pain). 12 tablet 0   • traMADol (ULTRAM) 50 MG tablet TAKE 1 TABLET BY MOUTH FOUR TIMES DAILY 120 tablet 0   • vitamin B-12 (CYANOCOBALAMIN) 1000 MCG tablet Take 1 tablet by mouth Daily. 30 tablet 5   • vitamin D (ERGOCALCIFEROL) 97300 units capsule capsule Take 50,000 Units by mouth 1 (One) Time Per Week.  11     No current facility-administered medications for this visit.         Allergies:      Allergies   Allergen Reactions   • Oxycodone Other (See Comments)     PT REPORTS THROAT FEELS THICK   • Levaquin [Levofloxacin] Rash   • Morphine And Related GI Intolerance        Past Surgical History:     Past Surgical History:   Procedure Laterality Date   • CERVICAL CONE BIOPSY     • HYSTERECTOMY     • TRANSURETHRAL RESECTION OF BLADDER TUMOR N/A 12/7/2020    Procedure: CYSTOSCOPY TRANSURETHRAL RESECTION OF BLADDER TUMOR;  Surgeon: Roldan Bunch MD;  Location: [facility];  Service: Urology;  Laterality: N/A;         Social History:     Social History     Socioeconomic History   • Marital status:      Spouse name: elvin   • Number of children: 2   • Years of education: 11   • Highest education level: Not on file   Occupational History   • Occupation: disabled   Social Needs   • Financial resource strain: Patient refused   • Food insecurity     Worry: Patient refused     Inability: Patient refused   • Transportation needs     Medical: Patient refused     Non-medical: Patient refused   Tobacco Use   • Smoking status: Current Every Day Smoker     Packs/day: 1.00     Types: Cigarettes   • Smokeless tobacco: Never Used   Substance and Sexual Activity   • Alcohol use: No   • Drug use: No   • Sexual activity: Defer   Lifestyle   • Physical activity     Days per week: Patient refused     Minutes per session: Patient refused   • Stress: Patient refused       Family History:     Family History   Problem Relation Age of Onset   • Cancer Father    •  Cancer Mother        Review of Systems:     Review of Systems   Constitutional: Negative.  Negative for activity change, appetite change, chills, diaphoresis, fatigue and unexpected weight change.   HENT: Negative for congestion, dental problem, drooling, ear discharge, ear pain, facial swelling, hearing loss, mouth sores, nosebleeds, postnasal drip, rhinorrhea, sinus pressure, sneezing, sore throat, tinnitus, trouble swallowing and voice change.    Eyes: Negative.  Negative for photophobia, pain, discharge, redness, itching and visual disturbance.   Respiratory: Negative.  Negative for apnea, cough, choking, chest tightness, shortness of breath, wheezing and stridor.    Cardiovascular: Negative.  Negative for chest pain, palpitations and leg swelling.   Gastrointestinal: Negative.  Negative for abdominal distention, abdominal pain, anal bleeding, blood in stool, constipation, diarrhea, nausea, rectal pain and vomiting.   Endocrine: Negative.  Negative for cold intolerance, heat intolerance, polydipsia, polyphagia and polyuria.   Genitourinary: Positive for dysuria.   Musculoskeletal: Negative.  Negative for arthralgias, back pain, gait problem, joint swelling, myalgias, neck pain and neck stiffness.   Skin: Negative.  Negative for color change, pallor, rash and wound.   Allergic/Immunologic: Negative.  Negative for environmental allergies, food allergies and immunocompromised state.   Neurological: Negative.  Negative for dizziness, tremors, seizures, syncope, facial asymmetry, speech difficulty, weakness, light-headedness, numbness and headaches.   Hematological: Negative.  Negative for adenopathy. Does not bruise/bleed easily.   Psychiatric/Behavioral: Negative for agitation, behavioral problems, confusion, decreased concentration, dysphoric mood, hallucinations, self-injury, sleep disturbance and suicidal ideas. The patient is not nervous/anxious and is not hyperactive.    All other systems reviewed and are  negative.      Physical Exam:     Physical Exam  Constitutional:       Appearance: She is well-developed.   HENT:      Head: Normocephalic and atraumatic.      Right Ear: External ear normal.      Left Ear: External ear normal.   Eyes:      Conjunctiva/sclera: Conjunctivae normal.      Pupils: Pupils are equal, round, and reactive to light.   Cardiovascular:      Rate and Rhythm: Normal rate and regular rhythm.      Heart sounds: Normal heart sounds.   Pulmonary:      Effort: Pulmonary effort is normal.      Breath sounds: Normal breath sounds.   Abdominal:      General: Bowel sounds are normal. There is no distension.      Palpations: Abdomen is soft. There is no mass.      Tenderness: There is no abdominal tenderness. There is no guarding or rebound.   Genitourinary:     Vagina: No vaginal discharge.   Musculoskeletal: Normal range of motion.   Skin:     General: Skin is warm and dry.   Neurological:      Mental Status: She is alert.      Deep Tendon Reflexes: Reflexes are normal and symmetric.   Psychiatric:         Behavior: Behavior normal.         Thought Content: Thought content normal.         Judgment: Judgment normal.         I have reviewed the following portions of the patient's history: allergies, current medications, past family history, past medical history, past social history, past surgical history, problem list and ROS and confirm it's accurate.      Procedure:       Assessment/Plan:   Bladder tumor-awaiting final path report we will notify of results when available            Patient's Body mass index is 31.23 kg/m². BMI is above normal parameters. Recommendations include: educational material.              This document has been electronically signed by SHALOM PRUITT MD December 15, 2020 08:43 EST

## 2020-12-23 DIAGNOSIS — G36.0 NEUROMYELITIS OPTICA (HCC): ICD-10-CM

## 2020-12-23 DIAGNOSIS — M79.2 NEUROPATHIC PAIN: ICD-10-CM

## 2020-12-23 RX ORDER — GABAPENTIN 600 MG/1
TABLET ORAL
Qty: 180 TABLET | Refills: 1 | Status: SHIPPED | OUTPATIENT
Start: 2020-12-23 | End: 2021-01-01

## 2020-12-28 DIAGNOSIS — N39.0 RECURRENT UTI: Primary | ICD-10-CM

## 2020-12-28 RX ORDER — NITROFURANTOIN 25; 75 MG/1; MG/1
100 CAPSULE ORAL 2 TIMES DAILY
Qty: 14 CAPSULE | Refills: 0 | Status: SHIPPED | OUTPATIENT
Start: 2020-12-28 | End: 2021-01-01

## 2020-12-28 NOTE — TELEPHONE ENCOUNTER
I called the pt to let her  bladder cancer so I doubt it is a urinary tract infection but she can send in a prescription for Macrobid tell her we are still waiting on her path report its been sent to [facility]. Routed to sandra to caroline

## 2020-12-31 ENCOUNTER — OFFICE VISIT (OUTPATIENT)
Dept: UROLOGY | Facility: CLINIC | Age: 60
End: 2020-12-31

## 2020-12-31 VITALS — TEMPERATURE: 98.3 F | BODY MASS INDEX: 30.86 KG/M2 | HEIGHT: 66 IN | WEIGHT: 192 LBS

## 2020-12-31 DIAGNOSIS — D49.4 BLADDER TUMOR: ICD-10-CM

## 2020-12-31 LAB
LAB AP CASE REPORT: NORMAL
PATH REPORT.FINAL DX SPEC: NORMAL

## 2020-12-31 PROCEDURE — 99214 OFFICE O/P EST MOD 30 MIN: CPT | Performed by: UROLOGY

## 2020-12-31 RX ORDER — HYDROCODONE BITARTRATE AND ACETAMINOPHEN 10; 325 MG/1; MG/1
1 TABLET ORAL EVERY 4 HOURS PRN
Qty: 40 TABLET | Refills: 0 | Status: SHIPPED | OUTPATIENT
Start: 2020-12-31 | End: 2021-01-01

## 2021-01-01 ENCOUNTER — CONSULT (OUTPATIENT)
Dept: ONCOLOGY | Facility: CLINIC | Age: 61
End: 2021-01-01

## 2021-01-01 ENCOUNTER — TELEPHONE (OUTPATIENT)
Dept: FAMILY MEDICINE CLINIC | Facility: CLINIC | Age: 61
End: 2021-01-01

## 2021-01-01 ENCOUNTER — TELEPHONE (OUTPATIENT)
Dept: ONCOLOGY | Facility: CLINIC | Age: 61
End: 2021-01-01

## 2021-01-01 ENCOUNTER — DOCUMENTATION (OUTPATIENT)
Dept: ONCOLOGY | Facility: HOSPITAL | Age: 61
End: 2021-01-01

## 2021-01-01 ENCOUNTER — LAB (OUTPATIENT)
Dept: LAB | Facility: HOSPITAL | Age: 61
End: 2021-01-01

## 2021-01-01 ENCOUNTER — INFUSION (OUTPATIENT)
Dept: ONCOLOGY | Facility: HOSPITAL | Age: 61
End: 2021-01-01

## 2021-01-01 ENCOUNTER — TRANSCRIBE ORDERS (OUTPATIENT)
Dept: ADMINISTRATIVE | Facility: HOSPITAL | Age: 61
End: 2021-01-01

## 2021-01-01 ENCOUNTER — OFFICE VISIT (OUTPATIENT)
Dept: FAMILY MEDICINE CLINIC | Facility: CLINIC | Age: 61
End: 2021-01-01

## 2021-01-01 VITALS
WEIGHT: 192.6 LBS | DIASTOLIC BLOOD PRESSURE: 67 MMHG | BODY MASS INDEX: 31.09 KG/M2 | HEART RATE: 106 BPM | RESPIRATION RATE: 18 BRPM | TEMPERATURE: 98 F | OXYGEN SATURATION: 97 % | SYSTOLIC BLOOD PRESSURE: 108 MMHG

## 2021-01-01 VITALS
BODY MASS INDEX: 30.95 KG/M2 | SYSTOLIC BLOOD PRESSURE: 108 MMHG | TEMPERATURE: 98 F | HEIGHT: 66 IN | OXYGEN SATURATION: 97 % | WEIGHT: 192.6 LBS | HEART RATE: 106 BPM | DIASTOLIC BLOOD PRESSURE: 67 MMHG | RESPIRATION RATE: 18 BRPM

## 2021-01-01 VITALS
HEIGHT: 66 IN | RESPIRATION RATE: 14 BRPM | SYSTOLIC BLOOD PRESSURE: 112 MMHG | OXYGEN SATURATION: 93 % | HEART RATE: 108 BPM | DIASTOLIC BLOOD PRESSURE: 60 MMHG | BODY MASS INDEX: 29.57 KG/M2 | TEMPERATURE: 97.1 F | WEIGHT: 184 LBS

## 2021-01-01 VITALS — WEIGHT: 170 LBS | HEIGHT: 66 IN | BODY MASS INDEX: 27.32 KG/M2

## 2021-01-01 DIAGNOSIS — Z87.440 H/O RECURRENT URINARY TRACT INFECTION: ICD-10-CM

## 2021-01-01 DIAGNOSIS — M79.2 NEUROPATHIC PAIN: ICD-10-CM

## 2021-01-01 DIAGNOSIS — G36.0 NEUROMYELITIS OPTICA (HCC): ICD-10-CM

## 2021-01-01 DIAGNOSIS — C67.9 UROTHELIAL CARCINOMA OF BLADDER (HCC): ICD-10-CM

## 2021-01-01 DIAGNOSIS — Z85.41 HISTORY OF CERVICAL CANCER: ICD-10-CM

## 2021-01-01 DIAGNOSIS — E55.9 VITAMIN D DEFICIENCY: ICD-10-CM

## 2021-01-01 DIAGNOSIS — J44.9 COPD MIXED TYPE (HCC): ICD-10-CM

## 2021-01-01 DIAGNOSIS — C67.9 UROTHELIAL CARCINOMA OF BLADDER (HCC): Primary | ICD-10-CM

## 2021-01-01 DIAGNOSIS — Z01.818 OTHER SPECIFIED PRE-OPERATIVE EXAMINATION: ICD-10-CM

## 2021-01-01 DIAGNOSIS — Z00.00 HEALTHCARE MAINTENANCE: ICD-10-CM

## 2021-01-01 DIAGNOSIS — J30.2 CHRONIC SEASONAL ALLERGIC RHINITIS: Primary | ICD-10-CM

## 2021-01-01 DIAGNOSIS — E78.2 MIXED HYPERLIPIDEMIA: ICD-10-CM

## 2021-01-01 DIAGNOSIS — F17.200 CURRENT SMOKER: ICD-10-CM

## 2021-01-01 DIAGNOSIS — Z01.818 OTHER SPECIFIED PRE-OPERATIVE EXAMINATION: Primary | ICD-10-CM

## 2021-01-01 DIAGNOSIS — I65.23 BILATERAL CAROTID ARTERY STENOSIS: ICD-10-CM

## 2021-01-01 LAB — SARS-COV-2 RNA NOSE QL NAA+PROBE: NOT DETECTED

## 2021-01-01 PROCEDURE — G0439 PPPS, SUBSEQ VISIT: HCPCS | Performed by: GENERAL PRACTICE

## 2021-01-01 PROCEDURE — U0005 INFEC AGEN DETEC AMPLI PROBE: HCPCS

## 2021-01-01 PROCEDURE — 1159F MED LIST DOCD IN RCRD: CPT | Performed by: GENERAL PRACTICE

## 2021-01-01 PROCEDURE — U0004 COV-19 TEST NON-CDC HGH THRU: HCPCS

## 2021-01-01 PROCEDURE — 51702 INSERT TEMP BLADDER CATH: CPT

## 2021-01-01 PROCEDURE — 99205 OFFICE O/P NEW HI 60 MIN: CPT | Performed by: INTERNAL MEDICINE

## 2021-01-01 PROCEDURE — 1170F FXNL STATUS ASSESSED: CPT | Performed by: GENERAL PRACTICE

## 2021-01-01 PROCEDURE — 1126F AMNT PAIN NOTED NONE PRSNT: CPT | Performed by: GENERAL PRACTICE

## 2021-01-01 PROCEDURE — C9803 HOPD COVID-19 SPEC COLLECT: HCPCS

## 2021-01-01 PROCEDURE — 99214 OFFICE O/P EST MOD 30 MIN: CPT | Performed by: GENERAL PRACTICE

## 2021-01-01 RX ORDER — GABAPENTIN 600 MG/1
1200 TABLET ORAL 3 TIMES DAILY
Qty: 180 TABLET | Refills: 0 | Status: SHIPPED | OUTPATIENT
Start: 2021-01-01

## 2021-01-01 RX ORDER — ACETAMINOPHEN AND CODEINE PHOSPHATE 300; 30 MG/1; MG/1
1 TABLET ORAL EVERY 4 HOURS PRN
Qty: 180 TABLET | Refills: 0 | Status: SHIPPED | OUTPATIENT
Start: 2021-01-01 | End: 2021-01-01

## 2021-01-01 RX ORDER — CIPROFLOXACIN 500 MG/1
TABLET, FILM COATED ORAL
Qty: 20 TABLET | Refills: 1 | Status: SHIPPED | OUTPATIENT
Start: 2021-01-01 | End: 2021-01-01 | Stop reason: SDUPTHER

## 2021-01-01 RX ORDER — AZATHIOPRINE 50 MG/1
50 TABLET ORAL 4 TIMES DAILY
Qty: 360 TABLET | Refills: 1 | Status: SHIPPED | OUTPATIENT
Start: 2021-01-01 | End: 2021-01-01

## 2021-01-01 RX ORDER — TRAMADOL HYDROCHLORIDE 50 MG/1
50 TABLET ORAL 4 TIMES DAILY
Qty: 120 TABLET | Refills: 0 | Status: SHIPPED | OUTPATIENT
Start: 2021-01-01

## 2021-01-01 RX ORDER — SATRALIZUMAB 120 MG/ML
INJECTION, SOLUTION SUBCUTANEOUS
COMMUNITY
Start: 2021-01-01

## 2021-01-01 RX ORDER — CIPROFLOXACIN 500 MG/1
500 TABLET, FILM COATED ORAL 2 TIMES DAILY
Qty: 20 TABLET | Refills: 1 | Status: SHIPPED | OUTPATIENT
Start: 2021-01-01 | End: 2021-01-01

## 2021-01-01 RX ORDER — CLOPIDOGREL BISULFATE 75 MG/1
75 TABLET ORAL DAILY
Qty: 30 TABLET | Refills: 5 | Status: SHIPPED | OUTPATIENT
Start: 2021-01-01

## 2021-01-01 RX ORDER — DIAZEPAM 5 MG/1
TABLET ORAL
Qty: 60 TABLET | Refills: 0 | Status: SHIPPED | OUTPATIENT
Start: 2021-01-01 | End: 2021-01-01

## 2021-01-01 RX ORDER — TRAMADOL HYDROCHLORIDE 50 MG/1
TABLET ORAL
Qty: 120 TABLET | Refills: 1 | Status: SHIPPED | OUTPATIENT
Start: 2021-01-01 | End: 2021-01-01

## 2021-01-01 RX ORDER — TRAMADOL HYDROCHLORIDE 50 MG/1
TABLET ORAL
Qty: 120 TABLET | Refills: 0 | Status: SHIPPED | OUTPATIENT
Start: 2021-01-01 | End: 2021-01-01

## 2021-01-01 RX ORDER — GABAPENTIN 600 MG/1
TABLET ORAL
Qty: 180 TABLET | Refills: 0 | Status: SHIPPED | OUTPATIENT
Start: 2021-01-01 | End: 2021-01-01 | Stop reason: SDUPTHER

## 2021-01-01 RX ORDER — TRAMADOL HYDROCHLORIDE 50 MG/1
TABLET ORAL
Qty: 120 TABLET | Refills: 0 | Status: SHIPPED | OUTPATIENT
Start: 2021-01-01 | End: 2021-01-01 | Stop reason: SDUPTHER

## 2021-01-01 RX ORDER — GABAPENTIN 600 MG/1
TABLET ORAL
Qty: 180 TABLET | Refills: 1 | Status: SHIPPED | OUTPATIENT
Start: 2021-01-01 | End: 2021-01-01

## 2021-01-01 RX ORDER — DIAZEPAM 5 MG/1
TABLET ORAL
Qty: 60 TABLET | Refills: 0 | Status: SHIPPED | OUTPATIENT
Start: 2021-01-01

## 2021-01-01 RX ORDER — CIPROFLOXACIN 500 MG/1
500 TABLET, FILM COATED ORAL 2 TIMES DAILY
Qty: 20 TABLET | Refills: 0 | Status: SHIPPED | OUTPATIENT
Start: 2021-01-01 | End: 2021-01-01 | Stop reason: SDUPTHER

## 2021-01-01 RX ORDER — GABAPENTIN 600 MG/1
TABLET ORAL
Qty: 180 TABLET | Refills: 0 | Status: SHIPPED | OUTPATIENT
Start: 2021-01-01 | End: 2021-01-01

## 2021-01-01 RX ORDER — BACLOFEN 20 MG/1
20 TABLET ORAL 4 TIMES DAILY
Qty: 120 TABLET | Refills: 5 | Status: SHIPPED | OUTPATIENT
Start: 2021-01-01

## 2021-01-01 RX ORDER — CLOPIDOGREL BISULFATE 75 MG/1
75 TABLET ORAL DAILY
Qty: 30 TABLET | Refills: 11 | Status: SHIPPED | OUTPATIENT
Start: 2021-01-01 | End: 2021-01-01

## 2021-01-07 PROBLEM — C67.9 UROTHELIAL CARCINOMA OF BLADDER (HCC): Status: ACTIVE | Noted: 2020-11-19

## 2021-01-07 NOTE — PROGRESS NOTES
Name:  Mary Alvares  :  1960  Date:  2021     REFERRING PHYSICIAN  Roldan Bunch MD    PRIMARY CARE PHYSICIAN  Jacky Bob MD    REASON FOR CONSULTATION  1. Urothelial carcinoma of bladder (CMS/HCC)      CHIEF COMPLAINT  Chronic pelvic pain, UTIs, incontinence and intermittent hematuria.    Dear Dr. Bunch,    HISTORY OF PRESENT ILLNESS:   Thank you for referring Ms. Alvares to our medical oncology clinic. As you are aware, she is a pleasant, 60 y.o., white female with a history of multiple medical problems, including localized cervical cancer (in ~, reportedly status post chemo/XRT and brachytherapy), chronic neurogenic bladder/incontinence/pain, repeated UTIs and tobacco abuse, who was referred to you in late 2020 for both these chronic urologic issues as well as new onset, painless hematuria. A cystoscopy in the office identified an area of ulceration on the left upper wall. You subsequently took her to the OR on 2020 for a TURBT. Final pathology of this area was ultimately consistent with invasive, poorly differentiated, high-grade, urothelial carcinoma. She is now referred to our clinic for further evaluation.    INTERIM HISTORY:  Ms. Alvares presents to clinic today for initial consultation accompanied by her sister and daughter. They confirm the above history. Other than her chronic urinary issues, which have decreased her quality of life for years (especially the frequent UTIs and chronic incontinence), she had no specific complaints today.    Past Medical History:   Diagnosis Date   • Acute cystitis    • Arthritis    • Blind right eye    • Cancer (CMS/HCC)     cervical   • Devic's disease (CMS/HCC)    • Elevated cholesterol    • GERD (gastroesophageal reflux disease)    • Hyperlipidemia    • Muscle spasms of lower extremity    • Neurogenic dysfunction of the urinary bladder    • Neuromyelitis optica (CMS/HCC)    • Neuropathic pain    • Self-catheterizes  urinary bladder    • Tachycardia    • Vitamin D deficiency        Past Surgical History:   Procedure Laterality Date   • CERVICAL CONE BIOPSY     • HYSTERECTOMY     • TRANSURETHRAL RESECTION OF BLADDER TUMOR N/A 12/7/2020    Procedure: CYSTOSCOPY TRANSURETHRAL RESECTION OF BLADDER TUMOR;  Surgeon: Rodlan Bunch MD;  Location: [facility];  Service: Urology;  Laterality: N/A;       Social History     Socioeconomic History   • Marital status:      Spouse name: elvin   • Number of children: 2   • Years of education: 11   • Highest education level: Not on file   Occupational History   • Occupation: disabled   Social Needs   • Financial resource strain: Patient refused   • Food insecurity     Worry: Patient refused     Inability: Patient refused   • Transportation needs     Medical: Patient refused     Non-medical: Patient refused   Tobacco Use   • Smoking status: Current Every Day Smoker     Packs/day: 1.00     Types: Cigarettes   • Smokeless tobacco: Never Used   Substance and Sexual Activity   • Alcohol use: No   • Drug use: No   • Sexual activity: Defer   Lifestyle   • Physical activity     Days per week: Patient refused     Minutes per session: Patient refused   • Stress: Patient refused       Family History   Problem Relation Age of Onset   • Cancer Father    • Cancer Mother        Allergies   Allergen Reactions   • Oxycodone Other (See Comments)     PT REPORTS THROAT FEELS THICK   • Levaquin [Levofloxacin] Rash   • Morphine And Related GI Intolerance       Current Outpatient Medications   Medication Sig Dispense Refill   • azaTHIOprine (IMURAN) 50 MG tablet TAKE 1 TABLET BY MOUTH FOUR TIMES DAILY 120 tablet 5   • baclofen (LIORESAL) 20 MG tablet TAKE 1 TABLET BY MOUTH FOUR TIMES DAILY 120 tablet 5   • ciprofloxacin (CIPRO) 500 MG tablet Take 1 tablet by mouth 2 (Two) Times a Day. 20 tablet 0   • clopidogrel (PLAVIX) 75 MG tablet Take 1 tablet by mouth Daily. 30 tablet 11   • diazePAM (VALIUM) 5 MG  tablet TAKE 1/2 TABLET BY MOUTH EVERY MORNING AND 1 1/2 TABLETS BY MOUTH EVERY NIGHT AT BEDTIME 60 tablet 0   • docusate sodium (COLACE) 100 MG capsule Take 100 mg by mouth Daily.     • gabapentin (NEURONTIN) 600 MG tablet TAKE 2 TABLETS BY MOUTH THREE TIMES DAILY 180 tablet 1   • HYDROcodone-acetaminophen (NORCO)  MG per tablet Take 1 tablet by mouth Every 4 (Four) Hours As Needed for Moderate Pain  (Pain). 40 tablet 0   • Mirabegron ER (Myrbetriq) 25 MG tablet sustained-release 24 hour 24 hr tablet Take  by mouth.     • phenazopyridine (PYRIDIUM) 200 MG tablet Take 1 tablet by mouth 3 (Three) Times a Day As Needed for Bladder Spasms. 60 tablet 3   • traMADol (ULTRAM) 50 MG tablet TAKE 1 TABLET BY MOUTH FOUR TIMES DAILY 120 tablet 0   • vitamin B-12 (CYANOCOBALAMIN) 1000 MCG tablet Take 1 tablet by mouth Daily. 30 tablet 5   • vitamin D (ERGOCALCIFEROL) 28439 units capsule capsule Take 50,000 Units by mouth 1 (One) Time Per Week.  11     No current facility-administered medications for this visit.      REVIEW OF SYSTEMS  CONSTITUTIONAL:  No fever, chills or night sweats. Chronic fatigue.  EYES:  No blurry vision, diplopia or other vision changes.  ENT:  No hearing loss, nosebleeds or sore throat.  CARDIOVASCULAR:  No palpitations, arrhythmia, syncopal episodes or edema.  PULMONARY:  No hemoptysis, wheezing, chronic cough or shortness of breath.  GASTROINTESTINAL:  No nausea or vomiting.  No constipation or diarrhea.  No abdominal pain.  GENITOURINARY:  As per the HPI above.  MUSCULOSKELETAL:  Chronic, diffuse joint pains, particularly in the pelvic region.  INTEGUMENTARY: No rashes or pruritus.  ENDOCRINE:  No excessive thirst or hot flashes.  HEMATOLOGIC:  No history of free bleeding, spontaneous bleeding or clotting.  IMMUNOLOGIC:  No allergies or frequent infections.  NEUROLOGIC: No numbness, tingling, seizures or weakness. Chronic, neurogenic bladder, as per the HPI above.  PSYCHIATRIC:  No  "anxiety.    PHYSICAL EXAMINATION  /67   Pulse 106   Temp 98 °F (36.7 °C) (Temporal)   Resp 18   Ht 167.6 cm (66\")   Wt 87.4 kg (192 lb 9.6 oz)   SpO2 97%   BMI 31.09 kg/m²     Pain Score:  Pain Score    01/07/21 1344   PainSc:   8     PHQ-Score Total:  PHQ-9 Total Score: 15    GENERAL:  A well-developed, well-nourished, white female in no acute distress.  HEENT:  Extraocular muscles intact.  CARDIOVASCULAR:  Regular rate and rhythm. No murmurs, gallops or rubs.  LUNGS:  Clear to auscultation bilaterally.  ABDOMEN:  Soft, nondistended with positive bowel sounds.  EXTREMITIES:  No clubbing, cyanosis or edema bilaterally.  SKIN:  No rashes or petechiae.  NEURO:  Cranial nerves grossly intact. No focal deficits.  PSYCH:  Alert and oriented x3.    LABORATORY  Lab Results   Component Value Date    WBC 7.78 12/04/2020    HGB 12.8 12/04/2020    HCT 42.5 12/04/2020    MCV 88.7 12/04/2020     12/04/2020    NEUTROABS 3.09 05/17/2018       Lab Results   Component Value Date     12/04/2020    K 3.8 12/04/2020    CL 99 12/04/2020    CO2 26.8 12/04/2020    BUN 14 12/04/2020    CREATININE 0.61 12/04/2020    GLUCOSE 140 (H) 12/04/2020    CALCIUM 9.7 12/04/2020    AST 31 (H) 05/17/2018    ALT 24 05/17/2018    ALKPHOS 89 05/17/2018    BILITOT 0.3 05/17/2018    PROTEINTOT 7.7 05/17/2018    ALBUMIN 4.50 05/17/2018     CBC (12/04/2020): WBCs: 7.78; HgB: 12.8; Hct: 42.5; platelets: 324    IMAGING  CT abdomen and pelvis with contrast (11/18/2020):  Impression: Abnormal thickening of the anterior wall of the urinary bladder, cystoscopic evaluation suggested. No other abnormalities were demonstrated.    PATHOLOGY  Urinary bladder, TUR, tumor (12/07/2020):  Invasive, poorly differentiated carcinoma, favor high grade urothelial carcinoma, involving the lamina propria. Muscularis propria not present.    IMPRESSION AND PLAN  Ms. Alvares is a 60 y.o., white female with:  1. Urothelial carcinoma: Diagnosed in late November " 2020 following a cystoscopy which revealed chronic radiation related changes/bullous lesions (from a history of treatment for localized cervical cancer in ~2005) with an area of ulceration in the left upper wall that, via TURBT on 12/07/2020, was ultimately confirmed to represent a poorly differentiated, high grade urothelial carcinoma. The lamina propria was involved, but there was no muscularis propria in the specimen. I had a long discussion with the patient, her daughter and her sister in clinic today regarding this (to date) diagnosis and, in general terms, its prognosis. In short, between her, at the age of 60, developing at least this one tumor, along with several chronic urological issues (see below) that have significantly decreased her quality of life for years, I agree with urology's opinion that she would likely benefit from a cystectomy. However, it is currently unclear whether or not she would potentially benefit from neoadjuvant chemotherapy prior to pursuing this (as whether or not she has muscle-invasive disease is currently not known for certain). She would therefore benefit from a repeat urologic evaluation prior to making any definite plans for chemotherapy. Per her preference, we will refer her to [facility] for this evaluation; and we will see her back in our clinic in ~six weeks (or sooner, depending on the additional findings).  2. Neurogenic bladder/UTIs/pelvic pain: These persistent and progressively problematic issues are likely related to her history of receiving radiation for localized cervical cancer in ~2005. Between them and issue #1, she would likely benefit from a cystectomy.  The patient, her daughter and her sister were in agreement with these plans.    It is a pleasure to participate in Ms. Alvares's care. Please do not hesitate to call with any questions or concerns that you may have.    A total of 60 minutes were spent coordinating this patient’s care in clinic today; more than  50% of this time was face-to-face with the patient, her daughter and her sister, reviewing her medical history, discussing the (to date) diagnosis and, in general terms, its prognosis and counseling on the current evaluation, potential treatment options and followup plan. All questions were answered to their satisfaction.    FOLLOW UP  Indwelling Hoffman placed today, per patient request. Rx for Cipro 500 mg BID x 10 days provided today. Per patient request, referral placed to [facility] for a second opinion/evaluation regarding urothelial carcinoma and neurogenic bladder/chronic UTI issues. Return to our clinic in ~6 weeks (or earlier, depending on the results of the pending urology evaluation).            This document was electronically signed by MARCIA Carballo MD January 8, 2021 10:11 EST      CC: MD Jacky Garcia MD

## 2021-01-07 NOTE — PROGRESS NOTES
PT sent down from  with . Pt has neurogenic bladder and having lots of incontinent episodes. Hoffman catheter to be ordered and pt following up with [facility]. Pt denies wanting to see  anymore. Hoffman catheter placed per sterile technique with no difficulty. Pt tolerated well. Pt requested leg bag. Leg bag teaching gone over with pt and pt verbalized understanding. Leg straps placed for comfort.

## 2021-01-07 NOTE — PROGRESS NOTES
Patient completed her PHQ depression screening.    PHQ-9 Total Score:  15    Patient seen in office visit this date by provider.    SS will follow.

## 2021-01-12 NOTE — TELEPHONE ENCOUNTER
CALLER: INGRID    RELATION: PT DAUGHTER    REASON:    DAUGHTER OF PT NEEDING A REF SENT TO DR. ARRON WALL AT THE [facility]. THEIR OFFICE BELIEVES THEY CAN GET PT IN BEFORE THE PRACTICE WE REFERRED COULD GET PT IN.    BEST C/B: [phone]

## 2021-01-12 NOTE — TELEPHONE ENCOUNTER
"----- Message from MARCIA Carballo MD sent at 1/12/2021  3:25 PM EST -----  It seems to me that anything stronger would be even more likely to make her \"crazy in the head\". As far as I currently know, her chronic pain symptoms have nothing to do with her cancer. We can try a Rx for Tylenol #3 (same sig). If this does not work are is not tolerated, she will need to get back with her PCP or urology. thx    ----- Message -----  From: Rupali Batres, KESHIA  Sent: 1/12/2021   3:12 PM EST  To: MARCIA Carballo MD    Patient's daughter reports patient was seen in ED in [place] due to rm clotting off.  They irrigated the catheter, and they ended up taking the cath out and having patient self cath.  She has a UTI and she is on an antibiotic. Patient is reporting pain however, daughter states she will not take Norco because it makes her feel bad.  Daughter is asking if we could change pain medication to something else to see if she can tolerate it.     ----- Message -----  From: Kathrin Elmore  Sent: 1/12/2021   3:00 PM EST  To: [facility]    Patient is in horrible pain.  Her daughter (Mary Boo) called and said her mom was unable to take what she had it, \"makes her crazy in the head.\"  If someone would please call her (the daughter) at [phone] and perhaps let her know something.  I told Mary to plead with her mom to take her pain medication.    I have her in with a urologist in [place] in a week and a half.         "

## 2021-01-14 NOTE — TELEPHONE ENCOUNTER
Patient's daughter, Edna, calling.    Patient needs refill on   Cipro 500 mg    Patient has one tablet left and her infection is not better at all.      Pharmacy in [facility] is correct ([facility])    Please call patient when sent- [phone]

## 2021-01-20 NOTE — TELEPHONE ENCOUNTER
Dr. Carballo referred pt to Urologist in [place]. They can't see her until February.    She is going to see a Urologist in [place]. (Dr. Rahat Florez) They can see her sooner if they get the records today.  Fax records to [phone] asap  Dr. Florez office phone -[phone]    Call pt today, please, at  [phone]

## 2021-03-23 NOTE — TELEPHONE ENCOUNTER
.    Caller: Mary Alvares    Relationship: Self    Best call back number: [phone]  Medication needed:   Requested Prescriptions     Pending Prescriptions Disp Refills   • ciprofloxacin (CIPRO) 500 MG tablet 20 tablet 1     Sig: Take 1 tablet by mouth 2 (Two) Times a Day.       When do you need the refill by: ASAP    What additional details did the patient provide when requesting the medicatio    Does the patient have less than a 3 day supply:  [x] Yes  [] No    What is the patient's preferred pharmacy:        [facility] #[id number] [address] AT [facility] [address] - [phone]  - [phone] FX

## 2021-04-05 NOTE — TELEPHONE ENCOUNTER
Caller: Mary Boo    Relationship: Emergency Contact    Best call back number: [phone] (H)    Medication needed:   baclofen (LIORESAL) 20 MG tablet   5 ordered         Summary: TAKE 1 TABLET BY MOUTH FOUR TIMES DAILY, Normal          When do you need the refill by: TODAY    What additional details did the patient provide when requesting the medication:   PATIENT IS COMPLETELY OUT    Does the patient have less than a 3 day supply:  [x] Yes  [] No    What is the patient's preferred pharmacy:      [facility] #[id number] [address] AT [facility] [address] - [phone] PH - [phone] FX    PATIENT DAUGHTER HAS BEEN ADVISED TO ALLOW 48 HOURS FOR THE CLINICAL TEAM TO FOLLOW UP ON THIS REQUEST,  IF SYMPTOMS WORSENS TO SEEK OUT EMERGENT CARE. PATIENT DAUGHTER STATES THAT SHE FULLY UNDERSTANDS.

## 2021-04-08 NOTE — TELEPHONE ENCOUNTER
Left a message for the office and let them know that we have not received paperwork and asked for them to refax them.

## 2021-04-08 NOTE — TELEPHONE ENCOUNTER
CHAPARRO FROM [employer] CALLED AND WOULD LIKE TO KNOW IF  HAS RECEIVED FORM FOR CATHETER, FAX WAS SENT OVER ON 3/29/2021.    PLEASE ADVISE.  CALL BACK:[phone]

## 2021-08-09 PROBLEM — R35.0 FREQUENCY OF URINATION: Status: RESOLVED | Noted: 2020-11-19 | Resolved: 2021-01-01

## 2021-08-09 PROBLEM — J01.10 ACUTE FRONTAL SINUSITIS: Status: RESOLVED | Noted: 2019-12-16 | Resolved: 2021-01-01

## 2021-08-09 NOTE — PROGRESS NOTES
Subjective   Mary Alvares is a 61 y.o. female.     Chief Complaint  She returns for a scheduled reassessment of multiple medical problems including bladder cancer, previous cervical cancer, and neuromyelitis optica    History of Present Illness     Bladder Cancer  She underwent a radical cystectomy with formation of an ileal conduit at [facility] on 3/30/2021 for a pT3b, Nx urothelial carcinoma.  This was complicated by a superficial wound dehiscence treated with a wound VAC.  She underwent urology reassessment with imaging on 7/28/2021 with evidence of right hydronephrosis and hydroureter to the level of the anastomosis at which an ill-defined soft tissue density was noted.  She has declined chemotherapy or immunotherapy.  She was scheduled for a 3-month follow-up.  She denies any hematuria and there is no history of any fever or chills.  Her appetite has been fair and she denies any nausea, vomiting, change in her bowel habits, hematochezia, or melena.    Neuromyelitis Optica  She complains of a persistent visual impairment associated with weakness of her gait and allodynia from the waist down. She denies any change in her symptoms. MRI of the brain performed on 8/27/18 was reported as showing chronic sinusitis, degenerative changes of the C spine, as well as an abnormal flow void in the left distal ICA consistent with simply slow flow or a stenosis. CTA performed on 3/27/2019 was reported as showing mild stenosis at the origin of the left common carotid artery and occlusion at the origin of the internal carotid artery.  Calcification with a focal 60% stenosis at the origin of the right internal carotid artery was also noted. She continues to deny any changes in her vision, strength or sensation and has had no problem talking or understanding what is said to her. She is LHD.  She is currently treated with enspryng injections with baclofen, gabapentin, tramadol, and diazepam for symptom relief.  She is scheduled to  undergo a neurology reassessment later this month    Dyslipidemia  Compliance with treatment has been fair. The patient exercises occasionally. She is no longer on rosuvastatin.  She continues to smoke    The following portions of the patient's history were reviewed and updated as appropriate: allergies, current medications, past medical history, past social history, past surgical history and problem list.    Review of Systems   Constitutional: Positive for fatigue. Negative for appetite change, chills, fever and unexpected weight change.   HENT: Positive for rhinorrhea and sneezing. Negative for congestion, ear pain, postnasal drip, sore throat and voice change.    Eyes: Positive for visual disturbance.   Respiratory: Positive for cough. Negative for shortness of breath and wheezing.    Cardiovascular: Negative for chest pain, palpitations and leg swelling.   Gastrointestinal: Negative for abdominal pain, blood in stool, constipation, diarrhea, nausea and vomiting.   Endocrine: Negative for polydipsia.   Genitourinary: Negative for flank pain, hematuria, pelvic pain and vaginal bleeding.   Musculoskeletal: Negative for arthralgias, back pain, joint swelling, myalgias and neck pain.   Skin: Negative for rash.   Neurological: Positive for weakness (mild generalized with gait instability) and numbness. Negative for tremors and headaches.   Psychiatric/Behavioral: Negative for dysphoric mood, sleep disturbance and suicidal ideas. The patient is not nervous/anxious.      Objective   Physical Exam  Constitutional:       General: She is not in acute distress.     Appearance: Normal appearance. She is well-developed. She is not diaphoretic.      Comments: Bright and in good spirits.  Gait slow and cautious.  Able to climb onto the exam table without assistance.  No apparent distress. No pallor, jaundice, diaphoresis, or cyanosis.   HENT:      Head: Atraumatic.      Right Ear: Tympanic membrane, ear canal and external ear  normal.      Left Ear: Tympanic membrane, ear canal and external ear normal.   Eyes:      Conjunctiva/sclera: Conjunctivae normal.   Neck:      Thyroid: No thyroid mass or thyromegaly.      Vascular: No carotid bruit or JVD.      Trachea: Trachea normal. No tracheal deviation.   Cardiovascular:      Rate and Rhythm: Regular rhythm. Tachycardia present.      Heart sounds: Normal heart sounds, S1 normal and S2 normal. No murmur heard.   No gallop.    Pulmonary:      Effort: Pulmonary effort is normal.      Breath sounds: Examination of the right-lower field reveals decreased breath sounds. Examination of the left-lower field reveals decreased breath sounds. Decreased breath sounds and wheezing (diffuse - mild) present.   Abdominal:      General: Bowel sounds are normal. There is no distension.   Musculoskeletal:      Right lower le+ Edema present.      Left lower le+ Edema present.   Lymphadenopathy:      Head:      Right side of head: No submental, submandibular, tonsillar, preauricular, posterior auricular or occipital adenopathy.      Left side of head: No submental, submandibular, tonsillar, preauricular, posterior auricular or occipital adenopathy.      Cervical: No cervical adenopathy.      Upper Body:      Right upper body: No supraclavicular adenopathy.      Left upper body: No supraclavicular adenopathy.   Skin:     General: Skin is warm.      Coloration: Skin is not cyanotic, jaundiced or pale.      Findings: No rash.      Nails: There is no clubbing.   Neurological:      Mental Status: She is alert and oriented to person, place, and time.      Cranial Nerves: No cranial nerve deficit.      Sensory: Sensory deficit (decreased vibration sense both feet) present.      Motor: Weakness present. No tremor.      Coordination: Coordination normal.      Gait: Gait abnormal.   Psychiatric:         Attention and Perception: Attention normal.         Mood and Affect: Mood normal.         Speech: Speech normal.          Behavior: Behavior normal.         Thought Content: Thought content normal.       Assessment/Plan   Problems Addressed this Visit        Allergies and Adverse Reactions    Chronic seasonal allergic rhinitis        Cardiac and Vasculature    Bilateral carotid artery stenosis  Reminded regarding risk factor modification with an emphasis on tobacco cessation.    Mixed hyperlipidemia  Encouraged to continue to work on her diet and exercise plan.  Scheduled for updated labs    Relevant Orders    Comprehensive Metabolic Panel    Lipid Panel    TSH       Endocrine and Metabolic    Vitamin D deficiency  Will continue to monitor    Relevant Orders    Vitamin D 25 Hydroxy       Health Encounters    Healthcare maintenance  Encouraged to obtain a COVID-19 immunization.  Lengthy discussion regarding the potential benefits and risks.  Patient remains uninterested in this, any other immunizations, or in breast or colon cancer screening       Hematology and Neoplasia    History of cervical cancer    Urothelial carcinoma of bladder (CMS/HCC)  pT3b Nx post radical cystectomy with ileal conduit  Supportive therapy  Follow up with urology    Relevant Orders    CBC & Differential       Neuro    Neuromyelitis optica (CMS/HCC)  Continue current medication  Follow up with neurology     Relevant Medications    gabapentin (NEURONTIN) 600 MG tablet    traMADol (ULTRAM) 50 MG tablet    Neuropathic pain    Relevant Medications    gabapentin (NEURONTIN) 600 MG tablet    traMADol (ULTRAM) 50 MG tablet    Other Relevant Orders    Vitamin B12       Pulmonary and Pneumonias    COPD mixed type (CMS/HCC)   COPD is stable.  Reminded of the importance of smoking cessation  Encouraged to remain as active as symptoms allow for       Tobacco    Current smoker      Diagnoses       Codes Comments    Chronic seasonal allergic rhinitis    -  Primary ICD-10-CM: J30.2  ICD-9-CM: 477.8     Mixed hyperlipidemia     ICD-10-CM: E78.2  ICD-9-CM: 272.2      Bilateral carotid artery stenosis     ICD-10-CM: I65.23  ICD-9-CM: 433.10, 433.30     Vitamin D deficiency     ICD-10-CM: E55.9  ICD-9-CM: 268.9     Healthcare maintenance     ICD-10-CM: Z00.00  ICD-9-CM: V70.0     Urothelial carcinoma of bladder (CMS/HCC)     ICD-10-CM: C67.9  ICD-9-CM: 188.9     History of cervical cancer     ICD-10-CM: Z85.41  ICD-9-CM: V10.41     Neuromyelitis optica (CMS/HCC)     ICD-10-CM: G36.0  ICD-9-CM: 341.0     COPD mixed type (CMS/HCC)     ICD-10-CM: J44.9  ICD-9-CM: 496     Current smoker     ICD-10-CM: F17.200  ICD-9-CM: 305.1     Neuropathic pain     ICD-10-CM: M79.2  ICD-9-CM: 729.2

## 2021-09-28 NOTE — TELEPHONE ENCOUNTER
Caller: Mary Alvares    Relationship: Self    Best call back number: [phone]    What medication are you requesting: #[id number] SAHIL UROSTOMY BAGS AND SKIN PREP PRSCRIPTIONS    What are your current symptoms: N/A    How long have you been experiencing symptoms: N/A    Have you had these symptoms before:    [x] Yes  [] No    Have you been treated for these symptoms before:   [x] Yes  [] No    If a prescription is needed, what is your preferred pharmacy and phone number: [facility] - [place], [place] - [address] - [phone]  - [phone]     Additional notes: PATIENT STATED THAT SHE WAS OUT OF THESE AND WOULD NEED PRESCRIPTIONS SENT TO [facility]     PLEASE ADVISE

## 2021-12-07 NOTE — TELEPHONE ENCOUNTER
Caller: HOSPICE - HARESH    Best call back number: [phone]    What is the call regarding:  HARESH IS CALLING TO SEE IF DR SMITH WILL REMAIN HER PHYSICAN WHILE SHE IS IN CARE OF HOSPICE AND TO VERIFY THAT SHE DOES HAVE A TERMINAL ILLNESS.

## 2021-12-09 NOTE — PROGRESS NOTES
The ABCs of the Annual Wellness Visit  Subsequent Medicare Wellness Visit    You have chosen to receive care through a telehealth visit.  Do you consent to use a video/audio connection for your medical care today? Yes    I did not complete this video visit with the patient using [employer] but rather [employer].    Chief Complaint  Subsequent Medicare Wellness Visit    Subjective    History of Present Illness:  Mary Alvares is a 61 y.o. female who presents for a Subsequent Medicare Wellness Visit.    Bladder Cancer  She underwent a radical cystectomy with formation of an ileal conduit at [facility] on 3/30/2021 for a pT3b, Nx urothelial carcinoma.  This was initially complicated by a superficial wound dehiscence treated with a wound VAC and she has since developed a colovaginal fistula.  She underwent urology reassessment with imaging on 7/28/2021 with evidence of right hydronephrosis and hydroureter to the level of the anastomosis at which an ill-defined soft tissue density was noted.  She has declined chemotherapy or immunotherapy.  She admits to a decreased appetite with intermittent nausea and pelvic pain.  The latter is rated as a 5-10.  The pain at present is relieved with tramadol.  There is no history of any vomiting or abdominal pain.  Unfortunately she has frequent leakage of stool from the vagina.  She has run a low-grade fever on and off.  She denies any hematuria, hematochezia, or melena and there is no history of any night sweats.  Home health was recently set up after she declined hospice    Neuromyelitis Optica  She complains of a persistent visual impairment associated with increasing weakness of her gait and allodynia from the waist down. MRI of the brain performed on 8/27/18 was reported as showing chronic sinusitis, degenerative changes of the C spine, as well as an abnormal flow void in the left distal ICA consistent with simply slow flow or a stenosis. CTA performed on 3/27/2019 was reported as showing  mild stenosis at the origin of the left common carotid artery and occlusion at the origin of the internal carotid artery.  Calcification with a focal 60% stenosis at the origin of the right internal carotid artery was also noted.  While generally weaker, she continues to deny any changes in her vision or sensation and has had no problem talking or understanding what is said to her. She is LHD.  She is currently treated with enspryng injections with baclofen, gabapentin, tramadol, and diazepam for symptom relief.    COPD  She admits to an intermittent dry cough but denies any chest pain, shortness of breath, or hemoptysis.  She continues to smoke 1/2 to 1 pack/day.    The following portions of the patient's history were reviewed and   updated as appropriate: allergies, current medications, past family history, past medical history, past social history, past surgical history and problem list.    Compared to one year ago, the patient feels her physical   health is worse.    Compared to one year ago, the patient feels her mental   health is worse.    Recent Hospitalizations:  She was admitted within the past 365 days at [facility].     Current Medical Providers:  Patient Care Team:  Jacky Bob MD as PCP - General (Family Medicine)    Outpatient Medications Prior to Visit   Medication Sig Dispense Refill   • baclofen (LIORESAL) 20 MG tablet Take 1 tablet by mouth 4 (Four) Times a Day. 120 tablet 5   • clopidogrel (PLAVIX) 75 MG tablet TAKE 1 TABLET BY MOUTH DAILY 30 tablet 5   • diazePAM (VALIUM) 5 MG tablet TAKE 1/2 TABLET BY MOUTH EVERY MORNING AND 1 1/2 TABLETS BY MOUTH EVERY NIGHT AT BEDTIME 60 tablet 0   • Enspryng 120 MG/ML solution prefilled syringe      • gabapentin (NEURONTIN) 600 MG tablet Take 2 tablets by mouth 3 (Three) Times a Day. 180 tablet 0   • traMADol (ULTRAM) 50 MG tablet Take 1 tablet by mouth 4 (Four) Times a Day. 120 tablet 0   • vitamin B-12 (CYANOCOBALAMIN) 1000 MCG tablet Take  1 tablet by mouth Daily. 30 tablet 5   • vitamin D (ERGOCALCIFEROL) 92411 units capsule capsule Take 50,000 Units by mouth 1 (One) Time Per Week.  11     No facility-administered medications prior to visit.     Opioid medication/s are on active medication list.  and I have evaluated her active treatment plan and pain score trends (see table).  There were no vitals filed for this visit.  I have reviewed the chart for potential of high risk medication and harmful drug interactions in the elderly.            Aspirin is not on active medication list.  Aspirin use is not indicated based on review of current medical condition/s. Risk of harm outweighs potential benefits.  .    Patient Active Problem List   Diagnosis   • Vitamin D deficiency   • COPD mixed type (HCC)   • Mixed hyperlipidemia   • Current smoker   • History of cervical cancer   • Neuropathic pain   • Neuromyelitis optica (HCC)   • Healthcare maintenance   • Chronic seasonal allergic rhinitis   • Bilateral carotid artery stenosis   • Urothelial carcinoma of bladder (HCC)     Advance Care Planning  Advance Directive is not on file.  ACP discussion was held with the patient during this visit. Patient does not have an advance directive, information provided.    Review of Systems   Constitutional: Positive for appetite change, fatigue and fever. Negative for chills and diaphoresis.   HENT: Positive for rhinorrhea. Negative for congestion, ear pain, postnasal drip, sinus pressure, sneezing, sore throat, tinnitus and voice change.    Eyes: Negative for visual disturbance.   Respiratory: Positive for cough. Negative for shortness of breath and wheezing.    Cardiovascular: Negative for chest pain, palpitations and leg swelling.   Gastrointestinal: Positive for nausea. Negative for abdominal pain, blood in stool, constipation, diarrhea and vomiting.   Endocrine: Negative for polydipsia and polyuria.   Genitourinary: Positive for vaginal discharge. Negative for  "dysuria, frequency, hematuria and urgency.   Musculoskeletal: Negative for arthralgias, back pain, joint swelling and myalgias.   Skin: Negative for rash.   Neurological: Positive for weakness and numbness.   Psychiatric/Behavioral: Positive for sleep disturbance. Negative for dysphoric mood and suicidal ideas. The patient is not nervous/anxious.         Objective    Vitals:    12/09/21 1308   Weight: 77.1 kg (170 lb)   Height: 167.6 cm (66\")     BMI Readings from Last 1 Encounters:   12/09/21 27.44 kg/m²   BMI is above normal parameters. Recommendations include: none (medical contraindication)    Does the patient have evidence of cognitive impairment? No    Physical Exam  Constitutional:       Comments: Accompanied by her daughter.  Lying in bed in no apparent distress.  Alert and oriented.  No pallor, cyanosis, diaphoresis, or jaundice   Pulmonary:      Comments: Normal respiratory effort.  No cough or audible wheezing  Skin:     Coloration: Skin is not cyanotic, jaundiced or pale.   Psychiatric:         Attention and Perception: Attention normal.         Mood and Affect: Mood normal.         Speech: Speech normal.         Behavior: Behavior normal.         Thought Content: Thought content normal.       HEALTH RISK ASSESSMENT    Smoking Status:  Social History     Tobacco Use   Smoking Status Current Every Day Smoker   • Packs/day: 1.00   • Types: Cigarettes   Smokeless Tobacco Never Used     Alcohol Consumption:  Social History     Substance and Sexual Activity   Alcohol Use No     Fall Risk Screen:  Fall Risk Assessment was completed, and patient is at high risk for falls.    Depression Screening:  PHQ-2/PHQ-9 Depression Screening 12/9/2021   Little interest or pleasure in doing things 0   Feeling down, depressed, or hopeless 0   Trouble falling or staying asleep, or sleeping too much -   Feeling tired or having little energy -   Poor appetite or overeating -   Feeling bad about yourself - or that you are a " failure or have let yourself or your family down -   Trouble concentrating on things, such as reading the newspaper or watching television -   Moving or speaking so slowly that other people could have noticed. Or the opposite - being so fidgety or restless that you have been moving around a lot more than usual -   Thoughts that you would be better off dead, or of hurting yourself in some way -   Total Score 0   If you checked off any problems, how difficult have these problems made it for you to do your work, take care of things at home, or get along with other people? -     Health Habits and Functional and Cognitive Screening:  Functional & Cognitive Status 12/9/2021   Do you have difficulty preparing food and eating? Yes   Do you have difficulty bathing yourself, getting dressed or grooming yourself? Yes   Do you have difficulty using the toilet? Yes   Do you have difficulty moving around from place to place? Yes   Do you have trouble with steps or getting out of a bed or a chair? Yes   Current Diet Well Balanced Diet   Dental Exam Not up to date   Eye Exam Not up to date   Exercise (times per week) 0 times per week   Current Exercises Include No Regular Exercise   Current Exercise Activities Include -   Do you need help using the phone?  No   Are you deaf or do you have serious difficulty hearing?  No   Do you need help with transportation? Yes   Do you need help shopping? Yes   Do you need help preparing meals?  Yes   Do you need help with housework?  Yes   Do you need help with laundry? Yes   Do you need help taking your medications? Yes   Do you need help managing money? Yes   Do you ever drive or ride in a car without wearing a seat belt? No   Have you felt unusual stress, anger or loneliness in the last month? No   Who do you live with? Spouse   If you need help, do you have trouble finding someone available to you? No   Have you been bothered in the last four weeks by sexual problems? No   Do you have  difficulty concentrating, remembering or making decisions? No     Age-appropriate Screening Schedule:  Refer to the list below for future screening recommendations based on patient's age, sex and/or medical conditions. Orders for these recommended tests are listed in the plan section. The patient has been provided with a written plan.    Health Maintenance   Topic Date Due   • LIPID PANEL  05/17/2019   • DXA SCAN  01/16/2020   • PAP SMEAR  01/16/2021   • INFLUENZA VACCINE  08/01/2021   • MAMMOGRAM  08/09/2022 (Originally 1/16/2019)   • TDAP/TD VACCINES (1 - Tdap) 08/09/2022 (Originally 4/28/1980)   • ZOSTER VACCINE (1 of 2) 08/09/2022 (Originally 4/28/2011)            Assessment/Plan   CMS Preventative Services Quick Reference  Risk Factors Identified During Encounter  Chronic Pain   Depression/Dysphoria  Fall Risk-High or Moderate  The above risks/problems have been discussed with the patient.  Follow up actions/plans if indicated are seen below in the Assessment/Plan Section.  Pertinent information has been shared with the patient in the After Visit Summary.    Diagnoses and all orders for this visit:    1. Chronic seasonal allergic rhinitis     2. Bilateral carotid artery stenosis    3. Mixed hyperlipidemia    4. Vitamin D deficiency    5. Healthcare maintenance  Patient remains uninterested in any immunizations    6. History of cervical cancer    7. Urothelial carcinoma of bladder (HCC)  With a colovaginal fistula and probable local recurrence  Supportive therapy  Prescription sent for a hospital bed, raised commode seat, and wheelchair.  Encouraged to let us know she should need anything else  Home health will be asked to draw labs    8. Neuromyelitis optica (HCC)  Continue current medication    9. Neuropathic pain  Continue current medication  Encouraged to report if any worse or if any new symptoms or concerns.    10. COPD mixed type (HCC)    11. Current smoker    Follow Up:   Return in about 2 months (around  2/22/2022).     An After Visit Summary and PPPS were made available to the patient.

## 2021-12-09 NOTE — PATIENT INSTRUCTIONS
Advance Directive    Advance directives are legal documents that let you make choices ahead of time about your health care and medical treatment in case you become unable to communicate for yourself. Advance directives are a way for you to make known your wishes to family, friends, and health care providers. This can let others know about your end-of-life care if you become unable to communicate.  Discussing and writing advance directives should happen over time rather than all at once. Advance directives can be changed depending on your situation and what you want, even after you have signed the advance directives.  There are different types of advance directives, such as:  · Medical power of .  · Living will.  · Do not resuscitate (DNR) or do not attempt resuscitation (DNAR) order.  Health care proxy and medical power of   A health care proxy is also called a health care agent. This is a person who is appointed to make medical decisions for you in cases where you are unable to make the decisions yourself. Generally, people choose someone they know well and trust to represent their preferences. Make sure to ask this person for an agreement to act as your proxy. A proxy may have to exercise judgment in the event of a medical decision for which your wishes are not known.  A medical power of  is a legal document that names your health care proxy. Depending on the laws in your state, after the document is written, it may also need to be:  · Signed.  · Notarized.  · Dated.  · Copied.  · Witnessed.  · Incorporated into your medical record.  You may also want to appoint someone to manage your money in a situation in which you are unable to do so. This is called a durable power of  for finances. It is a separate legal document from the durable power of  for health care. You may choose the same person or someone different from your health care proxy to act as your agent in money  matters.  If you do not appoint a proxy, or if there is a concern that the proxy is not acting in your best interests, a court may appoint a guardian to act on your behalf.  Living will  A living will is a set of instructions that state your wishes about medical care when you cannot express them yourself. Health care providers should keep a copy of your living will in your medical record. You may want to give a copy to family members or friends. To alert caregivers in case of an emergency, you can place a card in your wallet to let them know that you have a living will and where they can find it. A living will is used if you become:  · Terminally ill.  · Disabled.  · Unable to communicate or make decisions.  Items to consider in your living will include:  · To use or not to use life-support equipment, such as dialysis machines and breathing machines (ventilators).  · A DNR or DNAR order. This tells health care providers not to use cardiopulmonary resuscitation (CPR) if breathing or heartbeat stops.  · To use or not to use tube feeding.  · To be given or not to be given food and fluids.  · Comfort (palliative) care when the goal becomes comfort rather than a cure.  · Donation of organs and tissues.  A living will does not give instructions for distributing your money and property if you should pass away.  DNR or DNAR  A DNR or DNAR order is a request not to have CPR in the event that your heart stops beating or you stop breathing. If a DNR or DNAR order has not been made and shared, a health care provider will try to help any patient whose heart has stopped or who has stopped breathing. If you plan to have surgery, talk with your health care provider about how your DNR or DNAR order will be followed if problems occur.  What if I do not have an advance directive?  If you do not have an advance directive, some states assign family decision makers to act on your behalf based on how closely you are related to them. Each  state has its own laws about advance directives. You may want to check with your health care provider, , or state representative about the laws in your state.  Summary  · Advance directives are the legal documents that allow you to make choices ahead of time about your health care and medical treatment in case you become unable to tell others about your care.  · The process of discussing and writing advance directives should happen over time. You can change the advance directives, even after you have signed them.  · Advance directives include DNR or DNAR orders, living mendez, and designating an agent as your medical power of .  This information is not intended to replace advice given to you by your health care provider. Make sure you discuss any questions you have with your health care provider.  Document Revised: 01/28/2021 Document Reviewed: 07/16/2020  [employer]vier Patient Education © 2021 [employer]      Fall Prevention in the Home, Adult  Falls can cause injuries. They can happen to people of all ages. There are many things you can do to make your home safe and to help prevent falls. Ask for help when making these changes, if needed.  What actions can I take to prevent falls?  General Instructions  · Use good lighting in all rooms. Replace any light bulbs that burn out.  · Turn on the lights when you go into a dark area. Use night-lights.  · Keep items that you use often in easy-to-reach places. Lower the shelves around your home if necessary.  · Set up your furniture so you have a clear path. Avoid moving your furniture around.  · Do not have throw rugs and other things on the floor that can make you trip.  · Avoid walking on wet floors.  · If any of your floors are uneven, fix them.  · Add color or contrast paint or tape to clearly samira and help you see:  ? Any grab bars or handrails.  ? First and last steps of stairways.  ? Where the edge of each step is.  · If you use a stepladder:  ? Make  sure that it is fully opened. Do not climb a closed stepladder.  ? Make sure that both sides of the stepladder are locked into place.  ? Ask someone to hold the stepladder for you while you use it.  · If there are any pets around you, be aware of where they are.  What can I do in the bathroom?         · Keep the floor dry. Clean up any water that spills onto the floor as soon as it happens.  · Remove soap buildup in the tub or shower regularly.  · Use non-skid mats or decals on the floor of the tub or shower.  · Attach bath mats securely with double-sided, non-slip rug tape.  · If you need to sit down in the shower, use a plastic, non-slip stool.  · Install grab bars by the toilet and in the tub and shower. Do not use towel bars as grab bars.  What can I do in the bedroom?  · Make sure that you have a light by your bed that is easy to reach.  · Do not use any sheets or blankets that are too big for your bed. They should not hang down onto the floor.  · Have a firm chair that has side arms. You can use this for support while you get dressed.  What can I do in the kitchen?  · Clean up any spills right away.  · If you need to reach something above you, use a strong step stool that has a grab bar.  · Keep electrical cords out of the way.  · Do not use floor polish or wax that makes floors slippery. If you must use wax, use non-skid floor wax.  What can I do with my stairs?  · Do not leave any items on the stairs.  · Make sure that you have a light switch at the top of the stairs and the bottom of the stairs. If you do not have them, ask someone to add them for you.  · Make sure that there are handrails on both sides of the stairs, and use them. Fix handrails that are broken or loose. Make sure that handrails are as long as the stairways.  · Install non-slip stair treads on all stairs in your home.  · Avoid having throw rugs at the top or bottom of the stairs. If you do have throw rugs, attach them to the floor with  carpet tape.  · Choose a carpet that does not hide the edge of the steps on the stairway.  · Check any carpeting to make sure that it is firmly attached to the stairs. Fix any carpet that is loose or worn.  What can I do on the outside of my home?  · Use bright outdoor lighting.  · Regularly fix the edges of walkways and driveways and fix any cracks.  · Remove anything that might make you trip as you walk through a door, such as a raised step or threshold.  · Trim any bushes or trees on the path to your home.  · Regularly check to see if handrails are loose or broken. Make sure that both sides of any steps have handrails.  · Install guardrails along the edges of any raised decks and porches.  · Clear walking paths of anything that might make someone trip, such as tools or rocks.  · Have any leaves, snow, or ice cleared regularly.  · Use sand or salt on walking paths during winter.  · Clean up any spills in your garage right away. This includes grease or oil spills.  What other actions can I take?  · Wear shoes that:  ? Have a low heel. Do not wear high heels.  ? Have rubber bottoms.  ? Are comfortable and fit you well.  ? Are closed at the toe. Do not wear open-toe sandals.  · Use tools that help you move around (mobility aids) if they are needed. These include:  ? Canes.  ? Walkers.  ? Scooters.  ? Crutches.  · Review your medicines with your doctor. Some medicines can make you feel dizzy. This can increase your chance of falling.  Ask your doctor what other things you can do to help prevent falls.  Where to find more information  · Centers for Disease Control and Prevention, [place]: https://cdc.gov  · [employer] [place] [employer]: https://wh0hwqu.manuel.nih.gov  Contact a doctor if:  · You are afraid of falling at home.  · You feel weak, drowsy, or dizzy at home.  · You fall at home.  Summary  · There are many simple things that you can do to make your home safe and to help prevent falls.  · Ways to make your home  safe include removing tripping hazards and installing grab bars in the bathroom.  · Ask for help when making these changes in your home.  This information is not intended to replace advice given to you by your health care provider. Make sure you discuss any questions you have with your health care provider.  Document Revised: 04/09/2020 Document Reviewed: 08/02/2018  Elsevier Patient Education © 2021 [employer]

## 2022-03-02 NOTE — TELEPHONE ENCOUNTER
Pt is aware of this information.     ----- Message from Jacky Bob MD sent at 2/17/2020  3:14 PM EST -----  Emailed to kasandra    ----- Message -----  From: Luli Domínguez MA  Sent: 2/17/2020   2:21 PM EST  To: Jacky Bob MD    Patient has a UTI infection and wants to know if you could send her in some cirpo 500?       No

## (undated) DEVICE — HF-RESECTION ELECTRODE PLASMALOOP LOOP, MEDIUM, 24 FR., 12°-30°, ESG TURIS: Brand: OLYMPUS

## (undated) DEVICE — DRAINBAG,ANTI-REFLUX TOWER,L/F,2000ML,LL: Brand: MEDLINE

## (undated) DEVICE — COR CYSTO: Brand: MEDLINE INDUSTRIES, INC.

## (undated) DEVICE — CATHETER,FOLEY,100%SILICONE,20FR,10ML,LF: Brand: MEDLINE

## (undated) DEVICE — Y-TYPE TUR/BLADDER IRRIGATION SET, REGULATING CLAMP

## (undated) DEVICE — GLV SURG PREMIERPRO MIC LTX PF SZ8 BRN

## (undated) DEVICE — SYRINGE,TOOMEY,IRRIGATION,70CC,STERILE: Brand: MEDLINE